# Patient Record
Sex: MALE | Race: WHITE | Employment: UNEMPLOYED
[De-identification: names, ages, dates, MRNs, and addresses within clinical notes are randomized per-mention and may not be internally consistent; named-entity substitution may affect disease eponyms.]

---

## 2024-05-24 ENCOUNTER — APPOINTMENT (OUTPATIENT)
Facility: HOSPITAL | Age: 59
End: 2024-05-24
Attending: STUDENT IN AN ORGANIZED HEALTH CARE EDUCATION/TRAINING PROGRAM
Payer: COMMERCIAL

## 2024-05-24 ENCOUNTER — HOSPITAL ENCOUNTER (EMERGENCY)
Facility: HOSPITAL | Age: 59
Discharge: HOME | End: 2024-05-24
Attending: STUDENT IN AN ORGANIZED HEALTH CARE EDUCATION/TRAINING PROGRAM
Payer: COMMERCIAL

## 2024-05-24 ENCOUNTER — APPOINTMENT (OUTPATIENT)
Facility: HOSPITAL | Age: 59
End: 2024-05-24
Payer: COMMERCIAL

## 2024-05-24 ENCOUNTER — HOSPITAL ENCOUNTER (EMERGENCY)
Facility: HOSPITAL | Age: 59
Discharge: HOME | End: 2024-05-24
Payer: COMMERCIAL

## 2024-05-24 VITALS
DIASTOLIC BLOOD PRESSURE: 88 MMHG | WEIGHT: 213 LBS | SYSTOLIC BLOOD PRESSURE: 161 MMHG | RESPIRATION RATE: 18 BRPM | HEART RATE: 80 BPM | OXYGEN SATURATION: 95 % | TEMPERATURE: 97.9 F | BODY MASS INDEX: 28.23 KG/M2 | HEIGHT: 73 IN

## 2024-05-24 VITALS
BODY MASS INDEX: 29.16 KG/M2 | SYSTOLIC BLOOD PRESSURE: 138 MMHG | RESPIRATION RATE: 22 BRPM | HEART RATE: 72 BPM | DIASTOLIC BLOOD PRESSURE: 75 MMHG | TEMPERATURE: 97.9 F | WEIGHT: 220 LBS | OXYGEN SATURATION: 97 % | HEIGHT: 73 IN

## 2024-05-24 DIAGNOSIS — I10 HYPERTENSION, UNSPECIFIED TYPE: ICD-10-CM

## 2024-05-24 DIAGNOSIS — R07.9 CHEST PAIN, UNSPECIFIED TYPE: Primary | ICD-10-CM

## 2024-05-24 DIAGNOSIS — R07.89 CHEST WALL PAIN: Primary | ICD-10-CM

## 2024-05-24 LAB
ALBUMIN SERPL-MCNC: 3.5 G/DL (ref 3.5–5)
ALBUMIN SERPL-MCNC: 3.6 G/DL (ref 3.5–5)
ALBUMIN/GLOB SERPL: 1 (ref 1.1–2.2)
ALBUMIN/GLOB SERPL: 1.1 (ref 1.1–2.2)
ALP SERPL-CCNC: 61 U/L (ref 45–117)
ALP SERPL-CCNC: 62 U/L (ref 45–117)
ALT SERPL-CCNC: 33 U/L (ref 12–78)
ALT SERPL-CCNC: 35 U/L (ref 12–78)
AMORPH CRY URNS QL MICRO: ABNORMAL
AMPHET UR QL SCN: NEGATIVE
ANION GAP SERPL CALC-SCNC: 6 MMOL/L (ref 5–15)
ANION GAP SERPL CALC-SCNC: 7 MMOL/L (ref 5–15)
APPEARANCE UR: ABNORMAL
AST SERPL-CCNC: 20 U/L (ref 15–37)
AST SERPL-CCNC: 23 U/L (ref 15–37)
BACTERIA URNS QL MICRO: NEGATIVE /HPF
BARBITURATES UR QL SCN: NEGATIVE
BASOPHILS # BLD: 0 K/UL (ref 0–0.1)
BASOPHILS NFR BLD: 1 % (ref 0–1)
BENZODIAZ UR QL: NEGATIVE
BILIRUB SERPL-MCNC: 0.7 MG/DL (ref 0.2–1)
BILIRUB SERPL-MCNC: 0.7 MG/DL (ref 0.2–1)
BILIRUB UR QL CFM: NEGATIVE
BUN SERPL-MCNC: 15 MG/DL (ref 6–20)
BUN SERPL-MCNC: 17 MG/DL (ref 6–20)
BUN/CREAT SERPL: 14 (ref 12–20)
BUN/CREAT SERPL: 15 (ref 12–20)
CALCIUM SERPL-MCNC: 10.1 MG/DL (ref 8.5–10.1)
CALCIUM SERPL-MCNC: 10.3 MG/DL (ref 8.5–10.1)
CANNABINOIDS UR QL SCN: NEGATIVE
CHLORIDE SERPL-SCNC: 105 MMOL/L (ref 97–108)
CHLORIDE SERPL-SCNC: 107 MMOL/L (ref 97–108)
CO2 SERPL-SCNC: 27 MMOL/L (ref 21–32)
CO2 SERPL-SCNC: 28 MMOL/L (ref 21–32)
COCAINE UR QL SCN: POSITIVE
COLOR UR: ABNORMAL
COMMENT:: NORMAL
CREAT SERPL-MCNC: 1.05 MG/DL (ref 0.7–1.3)
CREAT SERPL-MCNC: 1.13 MG/DL (ref 0.7–1.3)
D DIMER PPP FEU-MCNC: 0.58 MG/L FEU (ref 0–0.65)
DIFFERENTIAL METHOD BLD: NORMAL
EOSINOPHIL # BLD: 0.1 K/UL (ref 0–0.4)
EOSINOPHIL NFR BLD: 2 % (ref 0–7)
EPITH CASTS URNS QL MICRO: ABNORMAL /LPF
ERYTHROCYTE [DISTWIDTH] IN BLOOD BY AUTOMATED COUNT: 11.8 % (ref 11.5–14.5)
ERYTHROCYTE [DISTWIDTH] IN BLOOD BY AUTOMATED COUNT: 11.9 % (ref 11.5–14.5)
ETHANOL SERPL-MCNC: <10 MG/DL (ref 0–0.08)
GLOBULIN SER CALC-MCNC: 3.4 G/DL (ref 2–4)
GLOBULIN SER CALC-MCNC: 3.5 G/DL (ref 2–4)
GLUCOSE BLD STRIP.AUTO-MCNC: 105 MG/DL (ref 65–117)
GLUCOSE SERPL-MCNC: 103 MG/DL (ref 65–100)
GLUCOSE SERPL-MCNC: 93 MG/DL (ref 65–100)
GLUCOSE UR STRIP.AUTO-MCNC: NEGATIVE MG/DL
HCT VFR BLD AUTO: 43.2 % (ref 36.6–50.3)
HCT VFR BLD AUTO: 43.2 % (ref 36.6–50.3)
HGB BLD-MCNC: 14.8 G/DL (ref 12.1–17)
HGB BLD-MCNC: 15.3 G/DL (ref 12.1–17)
HGB UR QL STRIP: NEGATIVE
IMM GRANULOCYTES # BLD AUTO: 0 K/UL (ref 0–0.04)
IMM GRANULOCYTES NFR BLD AUTO: 0 % (ref 0–0.5)
KETONES UR QL STRIP.AUTO: ABNORMAL MG/DL
LEUKOCYTE ESTERASE UR QL STRIP.AUTO: NEGATIVE
LIPASE SERPL-CCNC: 87 U/L (ref 13–75)
LYMPHOCYTES # BLD: 1.4 K/UL (ref 0.8–3.5)
LYMPHOCYTES NFR BLD: 26 % (ref 12–49)
Lab: ABNORMAL
MCH RBC QN AUTO: 32.9 PG (ref 26–34)
MCH RBC QN AUTO: 33.7 PG (ref 26–34)
MCHC RBC AUTO-ENTMCNC: 34.3 G/DL (ref 30–36.5)
MCHC RBC AUTO-ENTMCNC: 35.4 G/DL (ref 30–36.5)
MCV RBC AUTO: 95.2 FL (ref 80–99)
MCV RBC AUTO: 96 FL (ref 80–99)
METHADONE UR QL: NEGATIVE
MONOCYTES # BLD: 0.5 K/UL (ref 0–1)
MONOCYTES NFR BLD: 9 % (ref 5–13)
NEUTS SEG # BLD: 3.4 K/UL (ref 1.8–8)
NEUTS SEG NFR BLD: 62 % (ref 32–75)
NITRITE UR QL STRIP.AUTO: NEGATIVE
NRBC # BLD: 0 K/UL (ref 0–0.01)
NRBC # BLD: 0 K/UL (ref 0–0.01)
NRBC BLD-RTO: 0 PER 100 WBC
NRBC BLD-RTO: 0 PER 100 WBC
NT PRO BNP: 179 PG/ML (ref 0–125)
OPIATES UR QL: POSITIVE
PCP UR QL: NEGATIVE
PH UR STRIP: 5.5 (ref 5–8)
PLATELET # BLD AUTO: 187 K/UL (ref 150–400)
PLATELET # BLD AUTO: 190 K/UL (ref 150–400)
PMV BLD AUTO: 10 FL (ref 8.9–12.9)
PMV BLD AUTO: 9.9 FL (ref 8.9–12.9)
POTASSIUM SERPL-SCNC: 3.3 MMOL/L (ref 3.5–5.1)
POTASSIUM SERPL-SCNC: 3.5 MMOL/L (ref 3.5–5.1)
PROT SERPL-MCNC: 7 G/DL (ref 6.4–8.2)
PROT SERPL-MCNC: 7 G/DL (ref 6.4–8.2)
PROT UR STRIP-MCNC: 30 MG/DL
RBC # BLD AUTO: 4.5 M/UL (ref 4.1–5.7)
RBC # BLD AUTO: 4.54 M/UL (ref 4.1–5.7)
RBC #/AREA URNS HPF: ABNORMAL /HPF (ref 0–5)
SERVICE CMNT-IMP: NORMAL
SODIUM SERPL-SCNC: 139 MMOL/L (ref 136–145)
SODIUM SERPL-SCNC: 141 MMOL/L (ref 136–145)
SP GR UR REFRACTOMETRY: 1.03 (ref 1–1.03)
SPECIMEN HOLD: NORMAL
TROPONIN I SERPL HS-MCNC: 36 NG/L (ref 0–76)
TROPONIN I SERPL HS-MCNC: 41 NG/L (ref 0–76)
TROPONIN I SERPL HS-MCNC: 52 NG/L (ref 0–76)
TROPONIN I SERPL HS-MCNC: 53 NG/L (ref 0–76)
UROBILINOGEN UR QL STRIP.AUTO: 1 EU/DL (ref 0.2–1)
WBC # BLD AUTO: 4.9 K/UL (ref 4.1–11.1)
WBC # BLD AUTO: 5.5 K/UL (ref 4.1–11.1)
WBC URNS QL MICRO: ABNORMAL /HPF (ref 0–4)

## 2024-05-24 PROCEDURE — 81001 URINALYSIS AUTO W/SCOPE: CPT

## 2024-05-24 PROCEDURE — 82077 ASSAY SPEC XCP UR&BREATH IA: CPT

## 2024-05-24 PROCEDURE — 6370000000 HC RX 637 (ALT 250 FOR IP): Performed by: STUDENT IN AN ORGANIZED HEALTH CARE EDUCATION/TRAINING PROGRAM

## 2024-05-24 PROCEDURE — 96374 THER/PROPH/DIAG INJ IV PUSH: CPT

## 2024-05-24 PROCEDURE — 2580000003 HC RX 258

## 2024-05-24 PROCEDURE — 80307 DRUG TEST PRSMV CHEM ANLYZR: CPT

## 2024-05-24 PROCEDURE — 99285 EMERGENCY DEPT VISIT HI MDM: CPT

## 2024-05-24 PROCEDURE — 83880 ASSAY OF NATRIURETIC PEPTIDE: CPT

## 2024-05-24 PROCEDURE — 71046 X-RAY EXAM CHEST 2 VIEWS: CPT

## 2024-05-24 PROCEDURE — 82962 GLUCOSE BLOOD TEST: CPT

## 2024-05-24 PROCEDURE — 83690 ASSAY OF LIPASE: CPT

## 2024-05-24 PROCEDURE — 96375 TX/PRO/DX INJ NEW DRUG ADDON: CPT

## 2024-05-24 PROCEDURE — 36415 COLL VENOUS BLD VENIPUNCTURE: CPT

## 2024-05-24 PROCEDURE — 84484 ASSAY OF TROPONIN QUANT: CPT

## 2024-05-24 PROCEDURE — 85027 COMPLETE CBC AUTOMATED: CPT

## 2024-05-24 PROCEDURE — 6360000002 HC RX W HCPCS: Performed by: STUDENT IN AN ORGANIZED HEALTH CARE EDUCATION/TRAINING PROGRAM

## 2024-05-24 PROCEDURE — 6360000002 HC RX W HCPCS

## 2024-05-24 PROCEDURE — 80053 COMPREHEN METABOLIC PANEL: CPT

## 2024-05-24 PROCEDURE — 85025 COMPLETE CBC W/AUTO DIFF WBC: CPT

## 2024-05-24 PROCEDURE — 85379 FIBRIN DEGRADATION QUANT: CPT

## 2024-05-24 PROCEDURE — 99284 EMERGENCY DEPT VISIT MOD MDM: CPT

## 2024-05-24 RX ORDER — ONDANSETRON 2 MG/ML
4 INJECTION INTRAMUSCULAR; INTRAVENOUS ONCE
Status: COMPLETED | OUTPATIENT
Start: 2024-05-24 | End: 2024-05-24

## 2024-05-24 RX ORDER — MORPHINE SULFATE 4 MG/ML
4 INJECTION, SOLUTION INTRAMUSCULAR; INTRAVENOUS
Status: COMPLETED | OUTPATIENT
Start: 2024-05-24 | End: 2024-05-24

## 2024-05-24 RX ORDER — KETOROLAC TROMETHAMINE 30 MG/ML
15 INJECTION, SOLUTION INTRAMUSCULAR; INTRAVENOUS ONCE
Status: COMPLETED | OUTPATIENT
Start: 2024-05-24 | End: 2024-05-24

## 2024-05-24 RX ORDER — FAMOTIDINE 20 MG/1
20 TABLET, FILM COATED ORAL DAILY
Qty: 20 TABLET | Refills: 0 | Status: SHIPPED | OUTPATIENT
Start: 2024-05-24

## 2024-05-24 RX ORDER — NAPROXEN 500 MG/1
500 TABLET ORAL 2 TIMES DAILY
Qty: 20 TABLET | Refills: 0 | Status: SHIPPED | OUTPATIENT
Start: 2024-05-24

## 2024-05-24 RX ORDER — NITROGLYCERIN 0.4 MG/1
0.4 TABLET SUBLINGUAL ONCE
Status: COMPLETED | OUTPATIENT
Start: 2024-05-24 | End: 2024-05-24

## 2024-05-24 RX ORDER — DEXAMETHASONE SODIUM PHOSPHATE 10 MG/ML
10 INJECTION, SOLUTION INTRAMUSCULAR; INTRAVENOUS ONCE
Status: COMPLETED | OUTPATIENT
Start: 2024-05-24 | End: 2024-05-24

## 2024-05-24 RX ORDER — MORPHINE SULFATE 2 MG/ML
2 INJECTION, SOLUTION INTRAMUSCULAR; INTRAVENOUS
Status: COMPLETED | OUTPATIENT
Start: 2024-05-24 | End: 2024-05-24

## 2024-05-24 RX ORDER — LIDOCAINE 50 MG/G
1 PATCH TOPICAL DAILY
Qty: 10 PATCH | Refills: 0 | Status: SHIPPED | OUTPATIENT
Start: 2024-05-24 | End: 2024-06-03

## 2024-05-24 RX ORDER — 0.9 % SODIUM CHLORIDE 0.9 %
1000 INTRAVENOUS SOLUTION INTRAVENOUS ONCE
Status: COMPLETED | OUTPATIENT
Start: 2024-05-24 | End: 2024-05-24

## 2024-05-24 RX ADMIN — ONDANSETRON 4 MG: 2 INJECTION INTRAMUSCULAR; INTRAVENOUS at 18:48

## 2024-05-24 RX ADMIN — SODIUM CHLORIDE 1000 ML: 9 INJECTION, SOLUTION INTRAVENOUS at 22:06

## 2024-05-24 RX ADMIN — KETOROLAC TROMETHAMINE 15 MG: 30 INJECTION, SOLUTION INTRAMUSCULAR; INTRAVENOUS at 22:06

## 2024-05-24 RX ADMIN — NITROGLYCERIN 0.4 MG: 0.4 TABLET, ORALLY DISINTEGRATING SUBLINGUAL at 01:49

## 2024-05-24 RX ADMIN — MORPHINE SULFATE 4 MG: 4 INJECTION INTRAVENOUS at 03:31

## 2024-05-24 RX ADMIN — MORPHINE SULFATE 2 MG: 2 INJECTION, SOLUTION INTRAMUSCULAR; INTRAVENOUS at 18:48

## 2024-05-24 RX ADMIN — DEXAMETHASONE SODIUM PHOSPHATE 10 MG: 10 INJECTION, SOLUTION INTRAMUSCULAR; INTRAVENOUS at 22:06

## 2024-05-24 RX ADMIN — KETOROLAC TROMETHAMINE 15 MG: 30 INJECTION, SOLUTION INTRAMUSCULAR; INTRAVENOUS at 01:49

## 2024-05-24 ASSESSMENT — PAIN - FUNCTIONAL ASSESSMENT
PAIN_FUNCTIONAL_ASSESSMENT: 0-10
PAIN_FUNCTIONAL_ASSESSMENT: 0-10

## 2024-05-24 ASSESSMENT — HEART SCORE: ECG: NON-SPECIFC REPOLARIZATION DISTURBANCE/LBTB/PM

## 2024-05-24 ASSESSMENT — PAIN DESCRIPTION - ORIENTATION: ORIENTATION: LEFT

## 2024-05-24 ASSESSMENT — PAIN SCALES - GENERAL
PAINLEVEL_OUTOF10: 8
PAINLEVEL_OUTOF10: 7

## 2024-05-24 ASSESSMENT — PAIN DESCRIPTION - DESCRIPTORS: DESCRIPTORS: ACHING

## 2024-05-24 ASSESSMENT — PAIN DESCRIPTION - LOCATION: LOCATION: CHEST

## 2024-05-24 NOTE — ED NOTES
Verbal shift change report given to Sheila RN (oncoming nurse) by PRABHA RN (offgoing nurse). Report included the following information Nurse Handoff Report, ED Encounter Summary, ED SBAR, Intake/Output, MAR, and Recent Results.

## 2024-05-24 NOTE — ED NOTES
Patient has been instructed that they have been given morphine which contains opioids, benzodiazepines, or other sedating drugs. Patient is aware that they  will need to refrain from driving or operating heavy machinery after taking this medication.  Patient also instructed that they need to avoid drinking alcohol and using other products containing opioids, benzodiazepines, or other sedating drugs.  Patient verbalized understanding.

## 2024-05-24 NOTE — ED NOTES
Discharge instructions were given to the patient by BERE Powell.     The patient left the Emergency Department alert and oriented and in no acute distress with 3 prescriptions. The patient was encouraged to call or return to the ED for worsening issues or problems and was encouraged to schedule a follow up appointment for continuing care.     Ambulation assessment completed before discharge.  Pt left Emergency Department ambulating at baseline with no ortho devices  Ortho device education: none    The patient verbalized understanding of discharge instructions and prescriptions, all questions were answered. The patient has no further concerns at this time.
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
## 2024-05-24 NOTE — ED PROVIDER NOTES
lidocaine 5 %  Commonly known as: LIDODERM  Place 1 patch onto the skin daily for 10 days 12 hours on, 12 hours off.     naproxen 500 MG tablet  Commonly known as: Naprosyn  Take 1 tablet by mouth 2 times daily               Where to Get Your Medications        These medications were sent to [facility] [id number] - [place], [place] - [address] [facility] - P [phone] - F [phone]  [address] [facility] [address]      Phone: [phone]   famotidine 20 MG tablet  lidocaine 5 %  naproxen 500 MG tablet       2. Josh Mendiola III, MD  [address] [place] [address] [phone]    Schedule an appointment as soon as possible for a visit today      [facility] - [facility] OFFICE  [address] [place] [id number] [phone]  Call today      [facility] EMERGENCY DEPT  [address] [place] [address] [phone]    As needed, If symptoms worsen    Return to ED if worse     Diagnosis     Clinical Impression: Acute chest pain               Elizabeth Nath MD  05/24/24 4578

## 2024-05-24 NOTE — ED PROVIDER NOTES
[facility] EMERGENCY DEPT  EMERGENCY DEPARTMENT ENCOUNTER       Pt Name: Justino Preciado  MRN: [id number]  Birthdate 1965  Date of evaluation: 5/24/2024  Provider: Jennifer Mulligan PA-C   PCP: No primary care provider on file.  Note Started: 7:22 PM EDT 5/24/24     CHIEF COMPLAINT       Chief Complaint   Patient presents with    Chest Pain        HISTORY OF PRESENT ILLNESS: 1 or more elements      History From: Patient  HPI Limitations: None     Justino Preciado is a 59 y.o. male who presents to the emergency department for evaluation of left-sided chest and arm pain.  Patient additionally reporting weakness in the left upper extremity, however states that this is chronic.  Patient seen in the emergency department earlier this morning for the same complaint.  Patient has not picked up the medications prescribed to her earlier today, and additionally reports being noncompliant with his medications at home.  Patient reports nausea at this time, denies vomiting or diarrhea.     Nursing Notes were all reviewed and agreed with or any disagreements were addressed in the HPI.     REVIEW OF SYSTEMS      Review of Systems     Positives and Pertinent negatives as per HPI.    PAST HISTORY     Past Medical History:  Past Medical History:   Diagnosis Date    Hypertension        Past Surgical History:  History reviewed. No pertinent surgical history.    Family History:  History reviewed. No pertinent family history.    Social History:  Social History     Tobacco Use    Smoking status: Every Day     Types: Cigarettes    Smokeless tobacco: Never   Substance Use Topics    Alcohol use: Yes    Drug use: Not Currently       Allergies:  No Known Allergies    CURRENT MEDICATIONS      Previous Medications    FAMOTIDINE (PEPCID) 20 MG TABLET    Take 1 tablet by mouth daily    LIDOCAINE (LIDODERM) 5 %    Place 1 patch onto the skin daily for 10 days 12 hours on, 12 hours off.    NAPROXEN (NAPROSYN) 500 MG TABLET    Take 1 tablet by mouth 2 times

## 2024-05-24 NOTE — ED NOTES
Pt presents to ED ambulatory complaining of sharp middle chest pain radiating down to the left arm and generalized weakness. Pt also reports numbness and decreased sensation throughout left side. Pt states he was seen for same complaints earlier this morning, and not able to  medicine until after memorial day. Pt also reports headache, dizziness, and nausea. Pt is alert and oriented x 4, RR even and unlabored, skin is warm and dry. Assessment completed and pt updated on plan of care.  Call bell in reach.      Emergency Department Nursing Plan of Care       The Nursing Plan of Care is developed from the Nursing assessment and Emergency Department Attending provider initial evaluation.  The plan of care may be reviewed in the “ED Provider note”.    The Plan of Care was developed with the following considerations:   Patient / Family readiness to learn indicated by:verbalized understanding  Persons(s) to be included in education: patient  Barriers to Learning/Limitations:None    Signed

## 2024-05-24 NOTE — ED TRIAGE NOTES
Pt reports left sided chest pain and arm pain, generalized weakness. Pt seen for same earlier this morning, not able to  medicine until after memorial day. Also reports being nonadherent with home meds, concerned for a blockage to the left chest

## 2024-05-24 NOTE — ED TRIAGE NOTES
Pt reports to ED w/ constant central chest pain that radiates to left shoulder x 1 day.  Pt states has HX of HTN not compliant with meds.  Pt hypertensive during discharge.  Pt also states on statins but not compliant with meds.  Pt states pain feels like a stabbing sensation.

## 2024-05-28 LAB
EKG ATRIAL RATE: 77 BPM
EKG ATRIAL RATE: 81 BPM
EKG DIAGNOSIS: NORMAL
EKG DIAGNOSIS: NORMAL
EKG P AXIS: 16 DEGREES
EKG P AXIS: 48 DEGREES
EKG P-R INTERVAL: 156 MS
EKG P-R INTERVAL: 166 MS
EKG Q-T INTERVAL: 372 MS
EKG Q-T INTERVAL: 394 MS
EKG QRS DURATION: 100 MS
EKG QRS DURATION: 90 MS
EKG QTC CALCULATION (BAZETT): 432 MS
EKG QTC CALCULATION (BAZETT): 445 MS
EKG R AXIS: -2 DEGREES
EKG R AXIS: -30 DEGREES
EKG T AXIS: 105 DEGREES
EKG T AXIS: 105 DEGREES
EKG VENTRICULAR RATE: 77 BPM
EKG VENTRICULAR RATE: 81 BPM

## 2024-10-08 ENCOUNTER — HOSPITAL ENCOUNTER (EMERGENCY)
Facility: HOSPITAL | Age: 59
Discharge: HOME | End: 2024-10-08
Attending: EMERGENCY MEDICINE

## 2024-10-08 VITALS
HEIGHT: 73 IN | WEIGHT: 214.5 LBS | SYSTOLIC BLOOD PRESSURE: 189 MMHG | DIASTOLIC BLOOD PRESSURE: 105 MMHG | RESPIRATION RATE: 18 BRPM | BODY MASS INDEX: 28.43 KG/M2 | HEART RATE: 58 BPM | OXYGEN SATURATION: 96 % | TEMPERATURE: 97.7 F

## 2024-10-08 DIAGNOSIS — I16.0 HYPERTENSIVE URGENCY: Primary | ICD-10-CM

## 2024-10-08 PROCEDURE — 99283 EMERGENCY DEPT VISIT LOW MDM: CPT

## 2024-10-08 PROCEDURE — 93005 ELECTROCARDIOGRAM TRACING: CPT | Performed by: EMERGENCY MEDICINE

## 2024-10-08 PROCEDURE — 6370000000 HC RX 637 (ALT 250 FOR IP): Performed by: EMERGENCY MEDICINE

## 2024-10-08 RX ORDER — AMLODIPINE BESYLATE 10 MG/1
10 TABLET ORAL DAILY
Qty: 30 TABLET | Refills: 1 | Status: SHIPPED | OUTPATIENT
Start: 2024-10-08

## 2024-10-08 RX ORDER — LOSARTAN POTASSIUM 25 MG/1
25 TABLET ORAL DAILY
Qty: 30 TABLET | Refills: 1 | Status: SHIPPED | OUTPATIENT
Start: 2024-10-08

## 2024-10-08 RX ORDER — LOSARTAN POTASSIUM 25 MG/1
25 TABLET ORAL DAILY
Qty: 30 TABLET | Refills: 1 | Status: SHIPPED | OUTPATIENT
Start: 2024-10-08 | End: 2024-10-08

## 2024-10-08 RX ORDER — AMLODIPINE BESYLATE 10 MG/1
10 TABLET ORAL DAILY
Qty: 30 TABLET | Refills: 1 | Status: SHIPPED | OUTPATIENT
Start: 2024-10-08 | End: 2024-10-08

## 2024-10-08 RX ORDER — AMLODIPINE BESYLATE 5 MG/1
10 TABLET ORAL
Status: COMPLETED | OUTPATIENT
Start: 2024-10-08 | End: 2024-10-08

## 2024-10-08 RX ADMIN — AMLODIPINE BESYLATE 10 MG: 5 TABLET ORAL at 14:56

## 2024-10-08 ASSESSMENT — PAIN SCALES - GENERAL: PAINLEVEL_OUTOF10: 0

## 2024-10-08 ASSESSMENT — PAIN - FUNCTIONAL ASSESSMENT: PAIN_FUNCTIONAL_ASSESSMENT: 0-10

## 2024-10-08 NOTE — ED TRIAGE NOTES
Pt reports feeling lightheaded/dizzy x 1-2 months intermittently. Pt concerned that his BP is high, has not taken meds in 6 months

## 2024-10-08 NOTE — ED NOTES
Pt presents ambulatory to ED complaining of intermittent dizziness without vision changes, N/V x2 months. He reports being prescribed BP medication for HTN but has not taken them within x6 months due to running out and not getting a refill from PCP. Pt denies chest pain or SOB. He denies weakness or deficients in his extremities, speech is clear. Pt is alert and oriented x 4, RR even and unlabored, skin is warm and dry. Assessment completed and pt updated on plan of care.        Emergency Department Nursing Plan of Care        The Nursing Plan of Care is developed from the Nursing assessment and Emergency Department Attending provider initial evaluation.  The plan of care may be reviewed in the “ED Provider note”.

## 2024-10-08 NOTE — ED NOTES
Discharge instructions were given to the patient by Kayley Crum RN  .     The patient left the Emergency Department ambulatory, alert and oriented and in no acute distress with 2 prescriptions. The patient was encouraged to call or return to the ED for worsening issues or problems and was encouraged to schedule a follow up appointment for continuing care. Patient discharged home ambulatory with a steady gait.    The patient verbalized understanding of discharge instructions and prescriptions, all questions were answered. The patient has no further concerns at this time.

## 2024-10-08 NOTE — DISCHARGE INSTRUCTIONS
It was a pleasure taking care of you at [facility] Emergency Department today.  We know that when you come to [facility], you are entrusting us with your health, comfort, and safety.  Our physicians and nurses honor that trust, and we appreciate the opportunity to care for you and your loved ones.  We also value your feedback, and we would like to hear from you.    If you receive a  >>> survey <<< about your Emergency Department experience today, please fill it out. We review every single response from our patients. Thank you!

## 2024-10-08 NOTE — ED PROVIDER NOTES
reviewed and are negative.      Physical Exam   Vital Signs  Patient Vitals for the past 8 hrs:   Temp Pulse Resp BP SpO2   10/08/24 1445 -- -- -- (!) 198/102 96 %   10/08/24 1341 97.7 °F (36.5 °C) 58 18 (!) 193/107 100 %          Physical Exam  Vitals reviewed.   Constitutional:       General: He is not in acute distress.     Appearance: Normal appearance. He is not ill-appearing.   Cardiovascular:      Rate and Rhythm: Normal rate and regular rhythm.   Pulmonary:      Effort: Pulmonary effort is normal. No respiratory distress.      Breath sounds: No wheezing or rhonchi.   Abdominal:      General: Abdomen is flat. There is no distension.      Palpations: Abdomen is soft.      Tenderness: There is no abdominal tenderness.   Skin:     General: Skin is warm and dry.   Neurological:      General: No focal deficit present.      Mental Status: He is alert and oriented to person, place, and time.      Cranial Nerves: Cranial nerves 2-12 are intact. No cranial nerve deficit, dysarthria or facial asymmetry.      Sensory: Sensation is intact.      Motor: Motor function is intact. No abnormal muscle tone.      Coordination: Coordination is intact. Finger-Nose-Finger Test normal.         Diagnostic Study Results   Labs  Results for orders placed or performed during the hospital encounter of 10/08/24   EKG 12 Lead   Result Value Ref Range    Ventricular Rate 60 BPM    Atrial Rate 60 BPM    P-R Interval 164 ms    QRS Duration 90 ms    Q-T Interval 414 ms    QTc Calculation (Bazett) 414 ms    P Axis 44 degrees    R Axis 38 degrees    T Axis 52 degrees    Diagnosis       Normal sinus rhythm  Anteroseptal infarct (cited on or before 24-MAY-2024)  Abnormal ECG  When compared with ECG of 24-MAY-2024 18:32,  No significant change was found        ==============================================================    Radiologic Studies  No orders to display          Critical Care and Billable Procedures   EKG reviewed by ED Physician

## 2024-10-09 LAB
EKG ATRIAL RATE: 60 BPM
EKG DIAGNOSIS: NORMAL
EKG P AXIS: 44 DEGREES
EKG P-R INTERVAL: 164 MS
EKG Q-T INTERVAL: 414 MS
EKG QRS DURATION: 90 MS
EKG QTC CALCULATION (BAZETT): 414 MS
EKG R AXIS: 38 DEGREES
EKG T AXIS: 52 DEGREES
EKG VENTRICULAR RATE: 60 BPM

## 2025-04-13 ENCOUNTER — HOSPITAL ENCOUNTER (INPATIENT)
Facility: HOSPITAL | Age: 60
LOS: 11 days | Discharge: OTHER INSTITUTION | DRG: 751 | End: 2025-04-25
Attending: STUDENT IN AN ORGANIZED HEALTH CARE EDUCATION/TRAINING PROGRAM | Admitting: PSYCHIATRY & NEUROLOGY
Payer: MEDICAID

## 2025-04-13 ENCOUNTER — APPOINTMENT (OUTPATIENT)
Facility: HOSPITAL | Age: 60
DRG: 751 | End: 2025-04-13
Payer: MEDICAID

## 2025-04-13 DIAGNOSIS — R45.851 SUICIDAL IDEATION: Primary | ICD-10-CM

## 2025-04-13 LAB
ALBUMIN SERPL-MCNC: 3.5 G/DL (ref 3.5–5)
ALBUMIN/GLOB SERPL: 0.9 (ref 1.1–2.2)
ALP SERPL-CCNC: 103 U/L (ref 45–117)
ALT SERPL-CCNC: 19 U/L (ref 12–78)
AMPHET UR QL SCN: NEGATIVE
ANION GAP SERPL CALC-SCNC: 0 MMOL/L (ref 2–12)
APAP SERPL-MCNC: <2 UG/ML (ref 10–30)
APPEARANCE UR: CLEAR
AST SERPL-CCNC: 16 U/L (ref 15–37)
BACTERIA URNS QL MICRO: NEGATIVE /HPF
BARBITURATES UR QL SCN: NEGATIVE
BASOPHILS # BLD: 0.03 K/UL (ref 0–0.1)
BASOPHILS NFR BLD: 0.6 % (ref 0–1)
BENZODIAZ UR QL: NEGATIVE
BILIRUB SERPL-MCNC: 0.4 MG/DL (ref 0.2–1)
BILIRUB UR QL: NEGATIVE
BUN SERPL-MCNC: 11 MG/DL (ref 6–20)
BUN/CREAT SERPL: 13 (ref 12–20)
CALCIUM SERPL-MCNC: 10.2 MG/DL (ref 8.5–10.1)
CANNABINOIDS UR QL SCN: NEGATIVE
CHLORIDE SERPL-SCNC: 109 MMOL/L (ref 97–108)
CO2 SERPL-SCNC: 29 MMOL/L (ref 21–32)
COCAINE UR QL SCN: POSITIVE
COLOR UR: NORMAL
CREAT SERPL-MCNC: 0.85 MG/DL (ref 0.7–1.3)
DIFFERENTIAL METHOD BLD: NORMAL
EOSINOPHIL # BLD: 0.14 K/UL (ref 0–0.4)
EOSINOPHIL NFR BLD: 2.6 % (ref 0–7)
EPITH CASTS URNS QL MICRO: NORMAL /LPF
ERYTHROCYTE [DISTWIDTH] IN BLOOD BY AUTOMATED COUNT: 12.6 % (ref 11.5–14.5)
ETHANOL SERPL-MCNC: <10 MG/DL (ref 0–0.08)
FLUAV RNA SPEC QL NAA+PROBE: NOT DETECTED
FLUBV RNA SPEC QL NAA+PROBE: NOT DETECTED
GLOBULIN SER CALC-MCNC: 3.8 G/DL (ref 2–4)
GLUCOSE SERPL-MCNC: 100 MG/DL (ref 65–100)
GLUCOSE UR STRIP.AUTO-MCNC: NEGATIVE MG/DL
HCT VFR BLD AUTO: 42.4 % (ref 36.6–50.3)
HGB BLD-MCNC: 14.3 G/DL (ref 12.1–17)
HGB UR QL STRIP: NEGATIVE
HYALINE CASTS URNS QL MICRO: NORMAL /LPF (ref 0–5)
IMM GRANULOCYTES # BLD AUTO: 0.02 K/UL (ref 0–0.04)
IMM GRANULOCYTES NFR BLD AUTO: 0.4 % (ref 0–0.5)
KETONES UR QL STRIP.AUTO: NEGATIVE MG/DL
LEUKOCYTE ESTERASE UR QL STRIP.AUTO: NEGATIVE
LYMPHOCYTES # BLD: 1.56 K/UL (ref 0.8–3.5)
LYMPHOCYTES NFR BLD: 28.8 % (ref 12–49)
Lab: ABNORMAL
MCH RBC QN AUTO: 30.9 PG (ref 26–34)
MCHC RBC AUTO-ENTMCNC: 33.7 G/DL (ref 30–36.5)
MCV RBC AUTO: 91.6 FL (ref 80–99)
METHADONE UR QL: NEGATIVE
MONOCYTES # BLD: 0.31 K/UL (ref 0–1)
MONOCYTES NFR BLD: 5.7 % (ref 5–13)
NEUTS SEG # BLD: 3.36 K/UL (ref 1.8–8)
NEUTS SEG NFR BLD: 61.9 % (ref 32–75)
NITRITE UR QL STRIP.AUTO: NEGATIVE
NRBC # BLD: 0 K/UL (ref 0–0.01)
NRBC BLD-RTO: 0 PER 100 WBC
OPIATES UR QL: NEGATIVE
PCP UR QL: NEGATIVE
PH UR STRIP: 7 (ref 5–8)
PLATELET # BLD AUTO: 154 K/UL (ref 150–400)
PMV BLD AUTO: 10 FL (ref 8.9–12.9)
POTASSIUM SERPL-SCNC: 3.9 MMOL/L (ref 3.5–5.1)
PROT SERPL-MCNC: 7.3 G/DL (ref 6.4–8.2)
PROT UR STRIP-MCNC: NEGATIVE MG/DL
RBC # BLD AUTO: 4.63 M/UL (ref 4.1–5.7)
RBC #/AREA URNS HPF: NORMAL /HPF (ref 0–5)
SALICYLATES SERPL-MCNC: <1.7 MG/DL (ref 2.8–20)
SARS-COV-2 RNA RESP QL NAA+PROBE: NOT DETECTED
SODIUM SERPL-SCNC: 138 MMOL/L (ref 136–145)
SOURCE: NORMAL
SP GR UR REFRACTOMETRY: 1.02 (ref 1–1.03)
SPECIMEN HOLD: NORMAL
TROPONIN I SERPL HS-MCNC: 24 NG/L (ref 0–76)
URINE CULTURE IF INDICATED: NORMAL
UROBILINOGEN UR QL STRIP.AUTO: 0.2 EU/DL (ref 0.2–1)
WBC # BLD AUTO: 5.4 K/UL (ref 4.1–11.1)
WBC URNS QL MICRO: NORMAL /HPF (ref 0–4)

## 2025-04-13 PROCEDURE — 80307 DRUG TEST PRSMV CHEM ANLYZR: CPT

## 2025-04-13 PROCEDURE — 90791 PSYCH DIAGNOSTIC EVALUATION: CPT

## 2025-04-13 PROCEDURE — 80143 DRUG ASSAY ACETAMINOPHEN: CPT

## 2025-04-13 PROCEDURE — 71046 X-RAY EXAM CHEST 2 VIEWS: CPT

## 2025-04-13 PROCEDURE — 82077 ASSAY SPEC XCP UR&BREATH IA: CPT

## 2025-04-13 PROCEDURE — 80053 COMPREHEN METABOLIC PANEL: CPT

## 2025-04-13 PROCEDURE — 80179 DRUG ASSAY SALICYLATE: CPT

## 2025-04-13 PROCEDURE — 81001 URINALYSIS AUTO W/SCOPE: CPT

## 2025-04-13 PROCEDURE — 99285 EMERGENCY DEPT VISIT HI MDM: CPT

## 2025-04-13 PROCEDURE — 84484 ASSAY OF TROPONIN QUANT: CPT

## 2025-04-13 PROCEDURE — 6370000000 HC RX 637 (ALT 250 FOR IP): Performed by: NURSE PRACTITIONER

## 2025-04-13 PROCEDURE — 87636 SARSCOV2 & INF A&B AMP PRB: CPT

## 2025-04-13 PROCEDURE — 93005 ELECTROCARDIOGRAM TRACING: CPT | Performed by: NURSE PRACTITIONER

## 2025-04-13 PROCEDURE — 85025 COMPLETE CBC W/AUTO DIFF WBC: CPT

## 2025-04-13 RX ORDER — AMLODIPINE BESYLATE 5 MG/1
10 TABLET ORAL
Status: COMPLETED | OUTPATIENT
Start: 2025-04-13 | End: 2025-04-13

## 2025-04-13 RX ADMIN — AMLODIPINE BESYLATE 10 MG: 5 TABLET ORAL at 20:08

## 2025-04-13 ASSESSMENT — PAIN - FUNCTIONAL ASSESSMENT: PAIN_FUNCTIONAL_ASSESSMENT: NONE - DENIES PAIN
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
## 2025-04-13 NOTE — ED NOTES
6:11 PM    Pt reports \" I just feel like dying again.\" States that he has suicidal thoughts, \"walking in front of a bus.\" Was already seen at [facility] ER today for back pain, states that he told him he was suicidal and \"they can't help me.\" ( There is no notation of this in chart review).  No HI, delusions, hallucinations. Has chronic lower back pain.      Homeless currently. Has sister in [place]. (+ ) ETOH, denies tobacco/ substance abuse.  [facility] 1 mos ago [facility], was told to follow up with [facility]- and never has. Not taking any medications as directed. States last cocaine use was 1 mos ago.     I have evaluated the patient as the Provider in Rapid Medical Evaluation (RME). I have reviewed his vital signs and the triage nurse assessment. I have talked with the patient and any available family and advised that I am the provider in triage and have ordered the appropriate study to initiate their work up based on the clinical presentation during my assessment. I have advised that the patient will be accommodated in the Main ED as soon as possible. I have also requested to contact the triage nurse or myself immediately if the patient experiences any changes in their condition during this brief waiting period.  PRASHANT Bourgeois NP, Leslie A, APRN - NP  04/14/25 0034
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
## 2025-04-13 NOTE — ED PROVIDER NOTES
[facility] EMERGENCY DEPARTMENT  EMERGENCY DEPARTMENT ENCOUNTER      Pt Name: Justino Preciado  MRN: [id number]  Birthdate 1965  Date of evaluation: 4/13/2025  Provider: Christiano Marino DO    CHIEF COMPLAINT       Chief Complaint   Patient presents with    Suicidal         HISTORY OF PRESENT ILLNESS   (Location/Symptom, Timing/Onset, Context/Setting, Quality, Duration, Modifying Factors, Severity)  Note limiting factors.   Patient is a 60-year-old male history of hypertension presenting today with suicidal ideation.  He states that he has been suicidal for as long as he can remember but it is getting worse.  He blames it on circumstances regarding life but cannot pinpoint anything.  He states that about a month ago he tried to walk into traffic.  He denies doing anything to harm himself today.  [facility] but [facility].  Denies any drug or alcohol use to me.    He reports from a medical perspective he has chronic low back pain, had hematuria the other day which has resolved and has chest pain.  Chest pain seems to be associated with anxiety--stating that when he is suicidal and anxious the pain worsens but when the anxiety improves the chest pain goes away.  It is not pleuritic or exertional    Also reports cough and congestion x months            Review of External Medical Records:     Nursing Notes were reviewed.    REVIEW OF SYSTEMS    (2-9 systems for level 4, 10 or more for level 5)     Review of Systems   Cardiovascular:  Positive for chest pain.   Genitourinary:  Positive for hematuria.   Musculoskeletal:  Positive for back pain.       Except as noted above the remainder of the review of systems was reviewed and negative.       PAST MEDICAL HISTORY     Past Medical History:   Diagnosis Date    Hypertension          SURGICAL HISTORY     No past surgical history on file.      CURRENT MEDICATIONS       Previous Medications    AMLODIPINE (NORVASC) 10 MG TABLET    Take 1 tablet by mouth daily  DRUG SCREEN - Abnormal; Notable for the following components:    Cocaine, Urine Positive (*)     All other components within normal limits   URINE CULTURE HOLD SAMPLE   COVID-19 & INFLUENZA COMBO   CBC WITH AUTO DIFFERENTIAL   ETHANOL   URINALYSIS WITH REFLEX TO CULTURE   TROPONIN       All other labs were within normal range or not returned as of this dictation.          COURSE/REASSESSMENT     ED Course as of 04/13/25 2048   Sun Apr 13, 2025   1842 EKG time 1840  Normal sinus rhythm rate of 87 with left anterior fascicular block, narrow QRS, normal QTc, no ST elevations or [CC]   1930 D/w tele-psych. Recommending admission. Voluntary for now but seek TDO if tries to leave [CC]      ED Course User Index  [CC] Christiano Marino E, DO           CONSULTS:  IP CONSULT TO TELE-PSYCH (SOCIAL WORK ONLY)  IP CONSULT TO BSMART    PROCEDURES:  Unless otherwise noted below, none     Procedures      DIFFERENTIAL DIAGNOSIS/MDM:   Medical Decision Making  60-year-old male presents today with suicidal.  He has a plan with minimal resources.  Seen by psychiatry with plans of admission.  He also complained of hematuria although his urinalysis is clear today.  UDS is positive for cocaine so troponin has been ordered especially since he reports some chest discomfort.  This chest discomfort is a bit atypical for cardiac pain as he tells me it is present only when he is anxious or suicidal and it improves when he is feeling better.  He also has a cough and congestion therefore COVID and flu testing sent.  If troponin is negative and COVID/flu testing are negative he will be medically cleared as the remainder of his workup is reassuring.    He signed out to the nighttime doctor, Dr Macias, pending remainder of medical clearance and bed placement.     Problems Addressed:  Suicidal ideation: acute illness or injury    Amount and/or Complexity of Data Reviewed  Labs: ordered. Decision-making details documented in ED Course.  Radiology:

## 2025-04-14 PROBLEM — F25.9 SCHIZOAFFECTIVE DISORDER (HCC): Status: ACTIVE | Noted: 2025-04-14

## 2025-04-14 LAB
EKG ATRIAL RATE: 87 BPM
EKG DIAGNOSIS: NORMAL
EKG P AXIS: 31 DEGREES
EKG P-R INTERVAL: 162 MS
EKG Q-T INTERVAL: 364 MS
EKG QRS DURATION: 88 MS
EKG QTC CALCULATION (BAZETT): 438 MS
EKG R AXIS: -46 DEGREES
EKG T AXIS: 43 DEGREES
EKG VENTRICULAR RATE: 87 BPM

## 2025-04-14 PROCEDURE — 93010 ELECTROCARDIOGRAM REPORT: CPT | Performed by: INTERNAL MEDICINE

## 2025-04-14 PROCEDURE — 1240000000 HC EMOTIONAL WELLNESS R&B

## 2025-04-14 PROCEDURE — 6370000000 HC RX 637 (ALT 250 FOR IP): Performed by: NURSE PRACTITIONER

## 2025-04-14 RX ORDER — DIPHENHYDRAMINE HYDROCHLORIDE 50 MG/ML
50 INJECTION, SOLUTION INTRAMUSCULAR; INTRAVENOUS EVERY 4 HOURS PRN
Status: DISCONTINUED | OUTPATIENT
Start: 2025-04-14 | End: 2025-04-25 | Stop reason: HOSPADM

## 2025-04-14 RX ORDER — POLYETHYLENE GLYCOL 3350 17 G/17G
17 POWDER, FOR SOLUTION ORAL DAILY PRN
Status: DISCONTINUED | OUTPATIENT
Start: 2025-04-14 | End: 2025-04-25 | Stop reason: HOSPADM

## 2025-04-14 RX ORDER — HYDROXYZINE HYDROCHLORIDE 50 MG/1
50 TABLET, FILM COATED ORAL 3 TIMES DAILY PRN
Status: DISCONTINUED | OUTPATIENT
Start: 2025-04-14 | End: 2025-04-25 | Stop reason: HOSPADM

## 2025-04-14 RX ORDER — HALOPERIDOL 5 MG/ML
5 INJECTION INTRAMUSCULAR EVERY 4 HOURS PRN
Status: DISCONTINUED | OUTPATIENT
Start: 2025-04-14 | End: 2025-04-25 | Stop reason: HOSPADM

## 2025-04-14 RX ORDER — TRAZODONE HYDROCHLORIDE 50 MG/1
50 TABLET ORAL NIGHTLY PRN
Status: DISCONTINUED | OUTPATIENT
Start: 2025-04-14 | End: 2025-04-17

## 2025-04-14 RX ORDER — ACETAMINOPHEN 325 MG/1
650 TABLET ORAL EVERY 4 HOURS PRN
Status: DISCONTINUED | OUTPATIENT
Start: 2025-04-14 | End: 2025-04-25 | Stop reason: HOSPADM

## 2025-04-14 RX ORDER — MAGNESIUM HYDROXIDE/ALUMINUM HYDROXICE/SIMETHICONE 120; 1200; 1200 MG/30ML; MG/30ML; MG/30ML
30 SUSPENSION ORAL EVERY 6 HOURS PRN
Status: DISCONTINUED | OUTPATIENT
Start: 2025-04-14 | End: 2025-04-25 | Stop reason: HOSPADM

## 2025-04-14 RX ORDER — SENNOSIDES A AND B 8.6 MG/1
1 TABLET, FILM COATED ORAL DAILY PRN
Status: DISCONTINUED | OUTPATIENT
Start: 2025-04-14 | End: 2025-04-25 | Stop reason: HOSPADM

## 2025-04-14 RX ORDER — HALOPERIDOL 5 MG/1
5 TABLET ORAL EVERY 4 HOURS PRN
Status: DISCONTINUED | OUTPATIENT
Start: 2025-04-14 | End: 2025-04-25 | Stop reason: HOSPADM

## 2025-04-14 RX ADMIN — HYDROXYZINE HYDROCHLORIDE 50 MG: 50 TABLET, FILM COATED ORAL at 20:52

## 2025-04-14 ASSESSMENT — SLEEP AND FATIGUE QUESTIONNAIRES
DO YOU HAVE DIFFICULTY SLEEPING: YES
AVERAGE NUMBER OF SLEEP HOURS: 6
DO YOU USE A SLEEP AID: NO

## 2025-04-14 ASSESSMENT — LIFESTYLE VARIABLES
HOW OFTEN DO YOU HAVE A DRINK CONTAINING ALCOHOL: PATIENT DECLINED
HOW MANY STANDARD DRINKS CONTAINING ALCOHOL DO YOU HAVE ON A TYPICAL DAY: PATIENT DECLINED

## 2025-04-14 NOTE — BH NOTE
Admission unit:General    Received from: ED    Admission diagnosis:depression    Admission status: Voluntary     Pt is alert and oriented x4. Pt denies SI, HI, A/VH. Pt is hostile and guarded. Pt is very worried about his belongings being taken by staff. Pt did have a bottle of Gin, this RN reached out to director who states if he does not have someone to pick it up then we must disregard, pt did not have someone else to pick it up. Pt continues to be demanding an irritable towards staff.     Pt requested mucinex due to recent covid, still has some congestion and coughing.     Alcohol/drug: cocaine    PTA meds verified: Talked with [place] patient has not picked up medications in over 62 days. - norvasc, cozaar, pepcid, and naproxen.    PT or consults required: none at this time.

## 2025-04-14 NOTE — BSMART NOTE
This writer rounded on patient.  Patient agreeable to talk with this writer and was informed of counselor's role.    The patient's appearance shows no evidence of impairment.  The patient's behavior shows no evidence of impairment. The patient is oriented to time, place, person and situation.  The patient's speech shows no evidence of impairment.  The patient's mood  is depressed and is sad.  The range of affect is flat.  The patient's thought content  demonstrates thoughts of death.  The thought process perseveration.  The patient's perception demonstrated changes in the following:  auditory  hallucinations. The patient's memory shows no evidence of impairment.  The patient's appetite shows no evidence of impairment.  The patient's sleep shows no evidence of impairment. The patient shows little insight.  The patient's judgement is psychologically impaired.  Patient reports suicidal ideation and no homicidal ideation.   The plan is voluntary admission once medically cleared note is in. Patient agreeable to [place] kaylee.

## 2025-04-14 NOTE — CARE COORDINATION
04/14/25 1553   Suicidal Ideation   Wish to be Dead Lifetime - Yes;Past 1 month - Yes   Non-Specific Active Suicidal Thoughts Lifetime - Yes;Past 1 month - Yes   Suicidal Behavior Trigger Wish to be Dead;Non-Specific Active Suicidal Thoughts   Active Suicidal Ideation with Any Methods (Not Plan) without Intent to Act Lifetime - Yes;Past 1 month - Yes   Active Suicidal Ideation with Some Intent to Act, without Specific Plan Lifetime - Yes;Past 1 month - Yes   Active Suicidal Ideation with Specific Plan and Intent Lifetime - Yes;Past 1 month - Yes   Intensity of Ideation   Lifetime - Most Severe Ideation 3   Lifetime - Most Recent Ideation 2   Frequency 2-5 times in a week   Duration 1-4 hours/a lot of time   Controllability Can control thoughts with a lot of difficulty   Deterrents Does not apply   Reasons for Ideation Does not apply   Suicidal Behavior   Actual Attempt Lifetime - Yes;Past 3 months - Yes   Total # of Attempts 1   Has subject engaged in Non-Suicidal Self-Injurious Behavior? Lifetime - No;Past 3 months - No   Interrupted Attempt Lifetime - No;Past 3 months - No   Total # of interrupted 0   Aborted or Self-Interrupted Attempt Lifetime - No;Past 3 months - No   Total # of aborted or self-interrupted 0   Preparatory Acts or Behavior Lifetime - No;Past 3 months - No   Total # of Preparatory Acts 0   Actual Lethality/Medical Damage 1   Potential Lethality 0

## 2025-04-14 NOTE — ED NOTES
1950 - Assumed care of patient from BERE Venegas. Pt resting with no c/o at this time. He is in a green U gown. Reported pt's belonings have been secured and pt was wanded by security. Sitter present.     2005 - Pt provided with cheez-its, gingerale, and a warm blanket.

## 2025-04-14 NOTE — CARE COORDINATION
04/14/25 1540   ITP   Date of Plan 04/14/25   Date of Next Review 04/21/25   Primary Diagnosis Code Schizoaffective d/o   Barriers to Treatment Need for psychoeducation   Strengths Incorporated in Plan Acknowledging need for assistance   Plan of Care   Long Term Goal (LTG) Stated in patient/guardian terms Participate in out pt tx 12 wks   Short Term Goal 1   Short Term Goal 1 Client will remain safe during stay   Baseline Functioning SI w/plan to walk into traffic; Hx of attempts in same manner   Target mainttain safety   Objectives Client will participate in group therapy   Intervention  Assess safety   Frequency daily   Measured by Staff observation;Self report;Behavioral data   Staff Responsible Clinical staff;[facility] staff   Intervention 2 Acknowledge client strengths   Frequency daily   Measured by Staff observation;Self report;Behavioral data   Staff Responsible Clinical staff;[facility] staff   Intervention 3 Group therapy   Frequency daily   Measured by Staff observation;Self report;Behavioral data   Staff Responsible Clinical staff;[facility] staff   STG Goal 1 Status: Patient Appears to be  Progressing toward treatment plan goal   Short Term Goal 2   Short Term Goal 2 Client will maintain compliance with medication regime   Baseline Functioning ineffective or non compliant medications   Target medication adjustment and compliance   Intervention  Monitor medications   Frequency daily   Measured by Staff observation;Behavioral data   Staff Responsible Clinical staff;[facility] staff   Intervention 2 Crisis support   Frequency daily   Intervention 3 Referral to community services;Milieu therapy and support   Measured by Staff observation;Self report;Behavioral data   Staff Responsible Clinical staff;[facility] staff   STG Goal 2 Status: Patient Appears to be  Progressing toward treatment plan goal   Crisis/Safety/Discharge Plan   Comprehensive Assessment Completion Date 04/14/25   Discharge Plan Pt to discharge to [facility] with follow up care

## 2025-04-14 NOTE — BSMART NOTE
Per patient, he now only wants to be admitted at [facility]. Access advised, will let [facility] know to cancel admit order.

## 2025-04-14 NOTE — BSMART NOTE
Per Access - [phone] Foster for Dr. Hess.

## 2025-04-14 NOTE — ED NOTES
Patient informed about placement at [facility]. Patient states he does not want to go there and would like placement here. This RN spoke with BSMART. Transportation canceled.

## 2025-04-14 NOTE — ED NOTES
Patient is medically cleared for psychiatric admission.  MD Gilberto Clark Joseph, MD  04/14/25 0004

## 2025-04-14 NOTE — ED PROVIDER NOTES
ED Attending Physician Addendum    8:28 AM  Change of shift.  Care of patient transferred from Dr Hickman.  Awaiting placement.     MDM:    Pt is medically stable w/o any signs/symptoms of life threatening emergency medical conditions, no further emergency medical work-up is indicated at this time and pt is thus appropriate for inpatient psych evaluation.      EKG Interpretation  SR, narrow QRS, nl intervals, no SHANTI/STD/TWI    I personally reviewed and independently interpreted EKG, labs and imaging results.    Procedures    Admission on 04/13/2025   Component Date Value Ref Range Status    Ventricular Rate 04/13/2025 87  BPM Preliminary    Atrial Rate 04/13/2025 87  BPM Preliminary    P-R Interval 04/13/2025 162  ms Preliminary    QRS Duration 04/13/2025 88  ms Preliminary    Q-T Interval 04/13/2025 364  ms Preliminary    QTc Calculation (Bazett) 04/13/2025 438  ms Preliminary    P Axis 04/13/2025 31  degrees Preliminary    R Axis 04/13/2025 -46  degrees Preliminary    T Axis 04/13/2025 43  degrees Preliminary    Diagnosis 04/13/2025    Preliminary                    Value:Normal sinus rhythm  Left anterior fascicular block  Septal infarct (cited on or before 24-MAY-2024)  Abnormal ECG  When compared with ECG of 08-OCT-2024 13:54,  Left anterior fascicular block is now present  Nonspecific T wave abnormality, improved in Lateral leads      WBC 04/13/2025 5.4  4.1 - 11.1 K/uL Final    RBC 04/13/2025 4.63  4.10 - 5.70 M/uL Final    Hemoglobin 04/13/2025 14.3  12.1 - 17.0 g/dL Final    Hematocrit 04/13/2025 42.4  36.6 - 50.3 % Final    MCV 04/13/2025 91.6  80.0 - 99.0 FL Final    MCH 04/13/2025 30.9  26.0 - 34.0 PG Final    MCHC 04/13/2025 33.7  30.0 - 36.5 g/dL Final    RDW 04/13/2025 12.6  11.5 - 14.5 % Final    Platelets 04/13/2025 154  150 - 400 K/uL Final    MPV 04/13/2025 10.0  8.9 - 12.9 FL Final    Nucleated RBCs 04/13/2025 0.0  0  WBC Final    nRBC 04/13/2025 0.00  0.00 - 0.01 K/uL Final    Neutrophils  ng/mL  Cocaine:                     150 ng/mL  Methadone:                   300 ng/mL  Opiates:                     300 ng/mL   Phencyclidine, PCP:           25 ng/mL  Marijuana, THC:               50 ng/mL    This screening test can identify the presence of the following drugs   when above the cutoff value. A list of related and cross-reacting   compounds is locate                           d in the [facility]   Directory; Physician Resource Memos; List of Detectable Drugs.        Color, UA 04/13/2025 YELLOW/STRAW    Final    Color Reference Range: Straw, Yellow or Dark Yellow    Appearance 04/13/2025 CLEAR  CLEAR   Final    Specific Gravity, UA 04/13/2025 1.023  1.003 - 1.030   Final    pH, Urine 04/13/2025 7.0  5.0 - 8.0   Final    Protein, UA 04/13/2025 Negative  NEG mg/dL Final    Glucose, Ur 04/13/2025 Negative  NEG mg/dL Final    Ketones, Urine 04/13/2025 Negative  NEG mg/dL Final    Bilirubin, Urine 04/13/2025 Negative  NEG   Final    Blood, Urine 04/13/2025 Negative  NEG   Final    Urobilinogen, Urine 04/13/2025 0.2  0.2 - 1.0 EU/dL Final    Nitrite, Urine 04/13/2025 Negative  NEG   Final    Leukocyte Esterase, Urine 04/13/2025 Negative  NEG   Final    WBC, UA 04/13/2025 0-4  0 - 4 /hpf Final    RBC, UA 04/13/2025 0-5  0 - 5 /hpf Final    Epithelial Cells, UA 04/13/2025 FEW  FEW /lpf Final    Epithelial cell category consists of squamous cells and /or transitional urothelial cells. Renal tubular cells, if present, are separately identified as such.    BACTERIA, URINE 04/13/2025 Negative  NEG /hpf Final    Urine Culture if Indicated 04/13/2025 CULTURE NOT INDICATED BY UA RESULT  CNI   Final    Hyaline Casts, UA 04/13/2025 0-2  0 - 5 /lpf Final    Specimen HOld 04/13/2025 Urine on hold in Microbiology dept for 2 days.  If unpreserved urine is submitted, it cannot be used for addtional testing after 24 hours, recollection will be required.    Final    Source 04/13/2025

## 2025-04-14 NOTE — BSMART NOTE
Attempted to round on patient, however, he was asleep. Will check back once awake. Per access he will be accepted upstairs pending a discharge. Charge nurse, Ayanna, made aware.

## 2025-04-14 NOTE — BH NOTE
PSYCHOSOCIAL ASSESSMENT  :Patient identifying info:   Justino Preciado is a 60 y.o., male admitted 4/13/2025  6:09 PM     Presenting problem and precipitating factors: 59 yo male presented to ED due to increased depressive symptoms and SI with plan to walk into traffic. Pt reports admission last month for SI with attempt by same manner. PT reports chronic back pain. Pt has hx of anxiety, depression, schizoaffective d/o. Pt AH command in nature, telling him to hurt himself. Pt reports pain, homelessness, no enrique, and family conflicts as stressors. Pt also reports sleep disturbances. Pt denies HI, VH.     Mental status assessment: alert, oriented x's 3 - irritable , guarded     Strengths/Recreation/Coping Skills: willingness to seek treatment -    Collateral information:   He did not sign a LEONID   Current psychiatric /substance abuse providers and contact info: none    Previous psychiatric/substance abuse providers and response to treatment: previous admissions, last was 3/2025. No current medications. Previously on Paxil, Seroquel, Zyprexa, Prozac    Family history of mental illness or substance abuse: unknown     Substance abuse history:  UDS + Cocaine, BAL <10  Social History     Tobacco Use    Smoking status: Every Day     Types: Cigarettes    Smokeless tobacco: Never   Substance Use Topics    Alcohol use: Yes       History of biomedical complications associated with substance abuse: no    Patient's current acceptance of treatment or motivation for change: voluntary     Family constellation: - one child who lives in [place] - one sister who lives in [place]     Is significant other involved? No     Describe support system: none    Describe living arrangements and home environment: Homeless     GUARDIAN/POA: No    Guardian Name:     Guardian Contact:     Health issues:   Past Medical History:   Diagnosis Date    Hypertension         Trauma history: denies     Legal issues: no     History of  service:

## 2025-04-14 NOTE — BSMART NOTE
Patient was accepted to [facility] bed 748-1 by SOLO Foster on behalf of Dr. Kauffman. The number for report is ext [phone]. Patient's nurse notified.

## 2025-04-15 LAB
EKG ATRIAL RATE: 81 BPM
EKG DIAGNOSIS: NORMAL
EKG P AXIS: 33 DEGREES
EKG P-R INTERVAL: 162 MS
EKG Q-T INTERVAL: 358 MS
EKG QRS DURATION: 90 MS
EKG QTC CALCULATION (BAZETT): 415 MS
EKG R AXIS: 16 DEGREES
EKG T AXIS: 83 DEGREES
EKG VENTRICULAR RATE: 81 BPM

## 2025-04-15 PROCEDURE — 6370000000 HC RX 637 (ALT 250 FOR IP): Performed by: PSYCHIATRY & NEUROLOGY

## 2025-04-15 PROCEDURE — 93010 ELECTROCARDIOGRAM REPORT: CPT | Performed by: INTERNAL MEDICINE

## 2025-04-15 PROCEDURE — 6370000000 HC RX 637 (ALT 250 FOR IP)

## 2025-04-15 PROCEDURE — 93005 ELECTROCARDIOGRAM TRACING: CPT | Performed by: NURSE PRACTITIONER

## 2025-04-15 PROCEDURE — 1240000000 HC EMOTIONAL WELLNESS R&B

## 2025-04-15 PROCEDURE — 6370000000 HC RX 637 (ALT 250 FOR IP): Performed by: NURSE PRACTITIONER

## 2025-04-15 RX ORDER — GUAIFENESIN/DEXTROMETHORPHAN 100-10MG/5
5 SYRUP ORAL EVERY 4 HOURS PRN
Status: DISCONTINUED | OUTPATIENT
Start: 2025-04-15 | End: 2025-04-25 | Stop reason: HOSPADM

## 2025-04-15 RX ORDER — TOPIRAMATE 50 MG/1
50 TABLET, FILM COATED ORAL 2 TIMES DAILY
Status: ON HOLD | COMMUNITY
End: 2025-04-15 | Stop reason: ALTCHOICE

## 2025-04-15 RX ORDER — AMLODIPINE BESYLATE 5 MG/1
10 TABLET ORAL DAILY
Status: DISCONTINUED | OUTPATIENT
Start: 2025-04-15 | End: 2025-04-25 | Stop reason: HOSPADM

## 2025-04-15 RX ORDER — ESCITALOPRAM OXALATE 10 MG/1
5 TABLET ORAL DAILY
Status: DISCONTINUED | OUTPATIENT
Start: 2025-04-15 | End: 2025-04-16

## 2025-04-15 RX ORDER — HYDROXYZINE HYDROCHLORIDE 50 MG/1
50 TABLET, FILM COATED ORAL 3 TIMES DAILY PRN
Status: ON HOLD | COMMUNITY
End: 2025-04-25 | Stop reason: HOSPADM

## 2025-04-15 RX ORDER — ATORVASTATIN CALCIUM 20 MG/1
20 TABLET, FILM COATED ORAL DAILY
Status: ON HOLD | COMMUNITY
End: 2025-04-25

## 2025-04-15 RX ORDER — CLONIDINE HYDROCHLORIDE 0.2 MG/1
0.2 TABLET ORAL ONCE
Status: COMPLETED | OUTPATIENT
Start: 2025-04-15 | End: 2025-04-15

## 2025-04-15 RX ORDER — LISINOPRIL 10 MG/1
10 TABLET ORAL DAILY
Status: DISCONTINUED | OUTPATIENT
Start: 2025-04-15 | End: 2025-04-16

## 2025-04-15 RX ORDER — PRAZOSIN HYDROCHLORIDE 1 MG/1
1 CAPSULE ORAL NIGHTLY
Status: DISCONTINUED | OUTPATIENT
Start: 2025-04-15 | End: 2025-04-16

## 2025-04-15 RX ORDER — ATORVASTATIN CALCIUM 20 MG/1
20 TABLET, FILM COATED ORAL DAILY
Status: DISCONTINUED | OUTPATIENT
Start: 2025-04-15 | End: 2025-04-25 | Stop reason: HOSPADM

## 2025-04-15 RX ORDER — LISINOPRIL 10 MG/1
10 TABLET ORAL DAILY
Status: ON HOLD | COMMUNITY
End: 2025-04-25 | Stop reason: HOSPADM

## 2025-04-15 RX ORDER — AMLODIPINE BESYLATE 10 MG/1
10 TABLET ORAL DAILY
Status: ON HOLD | COMMUNITY
End: 2025-04-25

## 2025-04-15 RX ADMIN — AMLODIPINE BESYLATE 10 MG: 5 TABLET ORAL at 11:06

## 2025-04-15 RX ADMIN — ESCITALOPRAM OXALATE 5 MG: 10 TABLET ORAL at 11:05

## 2025-04-15 RX ADMIN — Medication 1 LOZENGE: at 09:11

## 2025-04-15 RX ADMIN — GUAIFENESIN AND DEXTROMETHORPHAN HYDROBROMIDE 1 TABLET: 600; 30 TABLET, EXTENDED RELEASE ORAL at 04:51

## 2025-04-15 RX ADMIN — CLONIDINE HYDROCHLORIDE 0.2 MG: 0.2 TABLET ORAL at 13:40

## 2025-04-15 RX ADMIN — LISINOPRIL 10 MG: 10 TABLET ORAL at 11:06

## 2025-04-15 RX ADMIN — GUAIFENESIN AND DEXTROMETHORPHAN 5 ML: 100; 10 SYRUP ORAL at 17:53

## 2025-04-15 RX ADMIN — ATORVASTATIN CALCIUM 20 MG: 20 TABLET, FILM COATED ORAL at 11:06

## 2025-04-15 ASSESSMENT — PAIN SCALES - GENERAL: PAINLEVEL_OUTOF10: 2

## 2025-04-15 ASSESSMENT — PAIN SCALES - WONG BAKER: WONGBAKER_NUMERICALRESPONSE: HURTS A LITTLE BIT

## 2025-04-15 NOTE — BH NOTE
GROUP THERAPY PROGRESS NOTE  Pt participated in Spirituality group  Katherine Gillies, MSW, [facility]

## 2025-04-15 NOTE — BH NOTE
GROUP THERAPY PROGRESS NOTE    Patient is participating in Community group.    Group time: 10 minutes    Personal goal for participation: to provide pts with information about unit protocol/procedures.     Goal orientation: Personal    Group therapy participation: Active     Therapeutic interventions reviewed and discussed: Members were able to gain an understanding of unit operations. FAQ sheet provided.     Impression of participation: SW provided FAQ sheet to pts and addressed questions and/or concerns about unit protocols     Katherine Gillies MSW, [facility]

## 2025-04-15 NOTE — INTERDISCIPLINARY ROUNDS
Behavioral Health Interdisciplinary Rounds     Patient Name: Justino Preciado  Age: 60 y.o.  Room/Bed:  [facility]  Primary Diagnosis: Schizoaffective disorder (HCC)   Admission Status: Voluntary     Readmission within 30 days:   Power of  in place:   Patient requires a blocked bed: No          Reason for blocked bed: N/A  Sleep hours: 6+  Flu vaccine : not received this season      Participation in Care/Groups:  Yes  Medication Compliant?:  Medication Compliant: Yes  PRNS (last 24 hours):  PRNS: Antianxiety    Restraints (last 24 hours):  NO  __________________________________________________  OQ Admission Analysis Survey completed:no  OQ Admission Analysis Survey score:    __________________________________________________     Alcohol screening (AUDIT) completed -  yes    Score: 1  Tobacco :yes   Illegal Drugs use: yes / cocaine  CSSR Lifetime: yes     24 hour chart check complete: Yes    _______________________________________________    Patient goal(s) for today: meet with treatment team   Treatment team focus/goals: Plan to assess for medications and discharge needs   Progress note: He reports being homeless and not working has been a stressor, No family support , just went to [facility] and was not admitted, he was admitted  to [facility] last month , but did not take medications , and did not follow up      Spiritual Care Consult:   Financial concerns/prescription coverage: ?  / no insurance   Family contact: no family support                        Family requesting physician contact today:    Discharge plan: he is homeless   Access to weapons : no                                                             Outpatient provider(s): refer to [facility]     LOS:  1  Expected LOS: TBD     Participating treatment team members: Umm Stone SW- Dr. Jamari Reyes, PharmD. - Sheila Reina RN

## 2025-04-15 NOTE — BH NOTE
PRN Medication Documentation    Specific patient behavior that led to need for PRN medication: Sore throat  Staff interventions attempted prior to PRN being given: Ginger ale  PRN medication given: Cepacol  Patient response/effectiveness of PRN medication: NAIMA loomis

## 2025-04-15 NOTE — PROGRESS NOTES
Spiritual Health History and Assessment/Progress Note  [facility]    Spiritual/Emotional Needs,  ,  , Spirituality Group    Name: Justino Preciado MRN: [id number]    Age: 60 y.o.     Sex: male   Language: English   Spiritism: Protestant   Schizoaffective disorder (HCC)     Date: 4/15/2025            Total Time Calculated: 50 min              Spiritual Assessment began in [facility]        Referral/Consult From: Patient   Encounter Overview/Reason: Spiritual/Emotional Needs  Service Provided For: Patient    Marleen, Belief, Meaning:   Patient is connected with a marleen tradition or spiritual practice  Family/Friends       Importance and Influence:  Patient has no beliefs influential to healthcare decision-making identified during this visit  Family/Friends     Community:  Patient expresses feelings of isolation: disconnected from family/friends, feeling there is no one to turn to for help, and feeling no one understands  Family/Friends     Assessment and Plan of Care:   Spiritual Distress: loneliness/isolation; loss of marleen; hopeless; questioning worth; suicidal with plan  Emotional Distress: experiencing homelessness for 2 years; financial insecurity; lack of support system  Pt reports that he moved to [facility] from [place] to live with his biological sister whom he was getting acquainted with after a 50 yr gap. They were both adopted. He is now struggling with regrets over this decision, as they were unable to rebuild a relationship. Pt has been homeless and unstable to the point where he wanted to end his life. Pt reports that he is Protestant, but has lost his marleen over this situation. Pt has a daughter in [place], but does not want to involve her because he feels embarrassed about his current situation.     I offered compassionate listening, explored spiritual strengths, and words of encouragement/hope. Will continue to follow for spiritual care.    Patient Interventions include: Facilitated expression of thoughts and

## 2025-04-15 NOTE — PLAN OF CARE
Problem: Depression  Goal: Will be euthymic at discharge  Description: INTERVENTIONS:1. Administer medication as ordered2. Provide emotional support via 1:1 interaction with staff3. Encourage involvement in milieu/groups/activities4. Monitor for social isolation  Outcome: Progressing  Note: Out on unit irritable, voicing frustration with unit routine and sharing common spaces. Challenging of nursing direction and unit routine. Required limit setting and prompts to follow unit routine. Depressed mood with hopelessness and passive SI with no plan. Review social stressors, lack of finances and support. Minimizing and deflecting from cocaine use. Pt verbalized understanding of medications ordered. Staff focus is on offering support

## 2025-04-15 NOTE — BH NOTE
GROUP THERAPY PROGRESS NOTE    Patient did not participate in Self-Care group.    Katherine Gillies MSW, [facility]

## 2025-04-15 NOTE — DISCHARGE INSTRUCTIONS
DISCHARGE SUMMARY    NAME:Justino Preciado  : 1965  MRN: [id number]    The patient Justino Preciado exhibits the ability to control behavior in a less restrictive environment.  Patient's level of functioning is improving.  No assaultive/destructive behavior has been observed for the past 24 hours.  No suicidal/homicidal threat or behavior has been observed for the past 24 hours.  There is no evidence of serious medication side effects.  Patient has not been in physical or protective restraints for at least the past 24 hours.    If weapons involved, how are they secured? none    Is patient aware of and in agreement with discharge plan? yes    Arrangements for medication:  Prescriptions filled here    Copy of discharge instructions to provider?:  yes faxed to [facility]     Arrangements for transportation home:  [place]    Keep all follow up appointments as scheduled, continue to take prescribed medications per physician instructions.  Mental health crisis number:  911 or your local mental health crisis line number at [phone]      Mental Health Emergency WARM LINE      [phone] ([phone])      M-F: 9am to 9pm      Sat & Sun: 5pm - 9pm  National suicide prevention lines:                             2-374-BIJVJIU (5-611-693-9291)       9-720-366-TALK ([phone])    Crisis Text Line:  Text HOME to 847681        DISCHARGE SUMMARY from Nurse    PATIENT INSTRUCTIONS:      What to do at Home:  Recommended activity: activity as tolerated,     If you experience any of the following symptoms: racing or intrusive thoughts, uncontrolled anxiety, urges to engage in self harm and high risk behaviors - talk with your support system including staff at [facility]    *  Please give a list of your current medications to your Primary Care Provider.    *  Please update this list whenever your medications are discontinued, doses are      changed, or new medications (including  over-the-counter products) are added.    *  Please carry medication information at all times in case of emergency situations.    These are general instructions for a healthy lifestyle:    No smoking/ No tobacco products/ Avoid exposure to second hand smoke  Surgeon General's Warning:  Quitting smoking now greatly reduces serious risk to your health.    Obesity, smoking, and sedentary lifestyle greatly increases your risk for illness    A healthy diet, regular physical exercise & weight monitoring are important for maintaining a healthy lifestyle    You may be retaining fluid if you have a history of heart failure or if you experience any of the following symptoms:  Weight gain of 3 pounds or more overnight or 5 pounds in a week, increased swelling in our hands or feet or shortness of breath while lying flat in bed.  Please call your doctor as soon as you notice any of these symptoms; do not wait until your next office visit.        The discharge information has been reviewed with the patient.  The patient verbalized understanding.  Discharge medications reviewed with the patient and appropriate educational materials and side effects teaching were provided.  ___________________________________________________________________________________________________________________________________

## 2025-04-15 NOTE — PLAN OF CARE
Pt is resting in bed with eyes closed, appears to be sleeping. Respirations are even and unlabored, NAD. Safety measures are in place and Q 15 minute rounds are done.   Problem: Safety - Adult  Goal: Free from fall injury  Outcome: Progressing

## 2025-04-15 NOTE — H&P
PSYCHIATRY EVALUATION NOTE    CHIEF COMPLAINT: 'i've been bipolar for years.'    HISTORY OF PRESENTING COMPLAINT:  Justino Preciado is a 60 y.o. White (non-) male w/pmh of HTN, HLD, and past psychiatric history of MDD, recurrent, severe, without psychosis, and cocaine use disorder presented to [facility] on 4/13/2025 who presented for worsening suicidal thoughts and plans to end his life.    Justino says that he's been struggling with depressive symptoms ever since he moved from [place], and has been attempting to end his life. He reports having walked in front of a bus. He came to hospital as a last resort before he attempts to end his life. He reports being depressed, experiencing recurrent nightmares and inability to motivate. He's been without resources and living in his care for the past 6 months.     He says that he's been diagnosed with bipolar disorder since birth, but his symptoms are consistent with depressive disorder. Review of psychiatric symptoms is negative for prerna or psychosis.    No access to fire-arms.    PAST PSYCHIATRIC HISTORY   1 past inpatient [facility], [facility]  1 suicide attempts, last being by walking in front of a bus.    SUBSTANCE USE HISTORY:  Tobacco: intermittent  Cannabis: none  Alcohol: rare  IVD: none  Cocaine intermittently.    PAST MEDICAL HISTORY:    Please see H&P for details.     Past Medical History:   Diagnosis Date    Hypertension      Prior to Admission medications    Not on File     Lab Results   Component Value Date    WBC 5.4 04/13/2025    HGB 14.3 04/13/2025    HCT 42.4 04/13/2025    MCV 91.6 04/13/2025     04/13/2025     Lab Results   Component Value Date     04/13/2025    K 3.9 04/13/2025     (H) 04/13/2025    CO2 29 04/13/2025    BUN 11 04/13/2025    CREATININE 0.85 04/13/2025    GLUCOSE 100 04/13/2025    CALCIUM 10.2 (H) 04/13/2025    BILITOT 0.4 04/13/2025    ALKPHOS 103 04/13/2025    AST 16 04/13/2025    ALT 19 04/13/2025     collateral information.Encourage patient to participate in programming and group activities.  Medication Regimen As follows:  start lexapro 5mg po qday.  Start prazosin 1mg po qhs  Start lisinporil, amlodipine, and atorvastatin.  Discussed 3-5 inpatient psychiatric admission.    I certify that this patients inpatient psychiatric hospital services furnished since the previous certification were, and continue to be, required for treatment that could reasonably be expected to improve the patient's condition, or for diagnostic study, and that the patient continues to need, on a daily basis, active treatment furnished directly by or requiring the supervision of inpatient psychiatric facility personnel. In addition, the hospital records show that services furnished were intensive treatment services, admission or related services, or equivalent services.      A coordinated, multidisplinary treatment team round was conducted with the patient, nurses, pharmcist,  and writer present. Discussions held with , and/or with family members; Complete current electronic health record for patient was reviewed in full including consultant notes, ancillary staff notes, nurses and tech notes, labs and vitals.

## 2025-04-15 NOTE — BH NOTE
GROUP THERAPY PROGRESS NOTE    Patient is participating in recreational therapy group.    Group time: 30 minutes    Personal goal for participation: To engage in “the trivia game” activity.    Goal orientation: Personal    Group therapy participation:  Active     Therapeutic interventions reviewed and discussed:  Group members were given the opportunity to engage in the “trivia game” activity. Members were able to exercise socialization and memory skills. Members interacted with peers.     Impression of participation: Pt was present and engaged in group activity and discussion. Pt interacted well with SW and peers.       Katherine Gillies, MSW, [facility]

## 2025-04-15 NOTE — PROGRESS NOTES
SPIRITUALITY GROUP      Meeting Topic: Methods of Spiritual Pathways    Meeting Time:  45-60 minutes    Meeting Goal: Patients will describe their personal definition of spirituality. The group will identify how their spiritual or Anabaptist beliefs are relevant to their mental health. The  will discuss various approaches to making spiritual connections and invite the group to explore a new spiritual practice. The group will conclude with a guided meditation.     Today's meeting focus: Gratitude    Justino Preciado attended and participated in the group appropriately.        For additional spiritual care, please contact the  on-call at ([phone]).    Jessica Beltre MDiv, MS, [facility]  Staff   Spiritual Health Services

## 2025-04-15 NOTE — BH NOTE
PRN Medication Documentation    Specific patient behavior that led to need for PRN medication: Cold  Staff interventions attempted prior to PRN being given: Rest, water  PRN medication given: Robitussin DM generic  Patient response/effectiveness of PRN medication: NAIMA aware

## 2025-04-15 NOTE — PROGRESS NOTES
Behavioral Services  Medicare Certification Upon Admission    I certify that this patient's inpatient psychiatric hospital admission is medically necessary for:    [x] (1) Treatment which could reasonably be expected to improve this patient's condition,       [] (2) Or for diagnostic study;     AND     [x](2) The inpatient psychiatric services are provided while the individual is under the care of a physician and are included in the individualized plan of care.    Estimated length of stay/service 3-5 days.    Plan for post-hospital care outpatient psychiatry.    Electronically signed by Huber Kauffman MD on 4/15/2025 at 10:11 AM

## 2025-04-15 NOTE — PROGRESS NOTES
Admission Medication Reconciliation:    Information obtained from:  patient interview, Insurance claims data, review of EMR, and [facility]  RxQuery data available¹:  YES    Comments/Recommendations: Updated PTA meds/reviewed patient's allergies.    1)  The patient denies taking his psychiatric medications prior to admission as they made him feel anxious and have difficulty sleeping. He does endorse taking his blood pressure and cholesterol regularly. He was recently hospitalized at [facility] psychiatry 3/18/25-3/25/25 and discharged on the following medications: fluoxetine, topiramate (for cocaine use), hydroxyzine, lisinopril, atorvastatin and amlodipine. Updated the PTA medications list. Patient states that he \"drinks\" and \"tried cocaine before but its nothing regular.\" Of note, the patient's BAL was 0 and UDS positive for cocaine.     2)  The [place] Prescription Monitoring Program () was assessed to determine fill history of any controlled medications. The patient has NOT filled any controlled medications in the last  2 years.    3)  Medication changes (since last review):  Added  - fluoxetine 20 mg daily  - lisinopril 10 mg daily  - atorvastatin 20 mg daily  - topiramate 50 mg BID    Removed  - famotidine, losartan, naproxen   ¹RxQuery pharmacy benefit data reflects medications filled and processed through the patient's insurance, however this data does NOT capture whether the medication was picked up or is currently being taken by the patient.    Allergies:  Patient has no known allergies.    Significant PMH/Disease States:   Past Medical History:   Diagnosis Date    Hypertension        Chief Complaint for this Admission:    Chief Complaint   Patient presents with    Suicidal     Prior to Admission Medications:   Prior to Admission Medications   Prescriptions Last Dose Informant   FLUoxetine (PROZAC) 20 MG capsule Not Taking    Sig: Take 1 capsule by mouth daily   Patient not taking: Reported on 4/15/2025

## 2025-04-15 NOTE — BH NOTE
GROUP THERAPY PROGRESS NOTE    Patient is participating in Expressions group.     Group time: 30 minutes    Personal goal for participation: To engage with SW and peers about likes/dislikes/needs on unit     Goal orientation: Personal    Group therapy participation: Active      Therapeutic interventions reviewed and discussed: Group allows members to interact with each other and SW to discuss concerns on unit     Impression of participation: Pt was present and engaged in group. Pt interacted with peers and .     Katherine Gillies MSW, [facility]-A

## 2025-04-15 NOTE — PLAN OF CARE
Problem: Depression  Goal: Will be euthymic at discharge  Description: INTERVENTIONS:1. Administer medication as ordered2. Provide emotional support via 1:1 interaction with staff3. Encourage involvement in milieu/groups/activities4. Monitor for social isolation  4/15/2025 1613 by Morgan Lowe RN  Outcome: Progressing     Problem: Drug Abuse/Detox  Goal: Will have no detox symptoms and will verbalize plan for changing drug-related behavior  Description: INTERVENTIONS:1. Administer medication as ordered2. Monitor physical status3. Provide emotional support with 1:1 interaction with staff4. Encourage  recovery focused treatment   Outcome: Progressing     Pt received resting in his room. Endorses passive SI with plan but denies HI/AVH. Irritable regarding unit routine policies and interactions with peer and staff. Feels he is 'being treated like a child.' Required limit setting and explanation of unit rules. Med and meal compliant. Q15 minute safety rounds continue.

## 2025-04-15 NOTE — BH NOTE
GROUP THERAPY PROGRESS NOTE    Patient is participating in psychoeducation group.    Group time: 45 mins.    Personal goal for participation: to develop an understanding of Cognitive Behavioral Therapy (CBT)    Goal orientation: Personal    Group therapy participation:  Active      Therapeutic interventions reviewed and discussed:  Group members were guided through learning about CBT through discussion and video. Members gained an understanding of the cognitive triangle and how our thoughts about situations determine our feelings and behaviors. Handouts provided.     Impression of participation:  Pt was present and engaged in group discussion. Pt added insight to group topic. Pt was calm, cooperative.     Katherine Gillies, MSW, [facility]-A

## 2025-04-16 PROCEDURE — 1240000000 HC EMOTIONAL WELLNESS R&B

## 2025-04-16 PROCEDURE — 6370000000 HC RX 637 (ALT 250 FOR IP): Performed by: PSYCHIATRY & NEUROLOGY

## 2025-04-16 PROCEDURE — 6370000000 HC RX 637 (ALT 250 FOR IP)

## 2025-04-16 RX ORDER — PRAZOSIN HYDROCHLORIDE 1 MG/1
2 CAPSULE ORAL NIGHTLY
Status: DISCONTINUED | OUTPATIENT
Start: 2025-04-16 | End: 2025-04-18

## 2025-04-16 RX ORDER — ESCITALOPRAM OXALATE 10 MG/1
10 TABLET ORAL DAILY
Status: DISCONTINUED | OUTPATIENT
Start: 2025-04-17 | End: 2025-04-21

## 2025-04-16 RX ORDER — LISINOPRIL 20 MG/1
20 TABLET ORAL DAILY
Status: DISCONTINUED | OUTPATIENT
Start: 2025-04-17 | End: 2025-04-18

## 2025-04-16 RX ORDER — CLONIDINE HYDROCHLORIDE 0.1 MG/1
0.1 TABLET ORAL EVERY 6 HOURS PRN
Status: DISCONTINUED | OUTPATIENT
Start: 2025-04-16 | End: 2025-04-25 | Stop reason: HOSPADM

## 2025-04-16 RX ADMIN — PRAZOSIN HYDROCHLORIDE 1 MG: 1 CAPSULE ORAL at 01:07

## 2025-04-16 RX ADMIN — LISINOPRIL 10 MG: 10 TABLET ORAL at 09:08

## 2025-04-16 RX ADMIN — GUAIFENESIN AND DEXTROMETHORPHAN 5 ML: 100; 10 SYRUP ORAL at 09:45

## 2025-04-16 RX ADMIN — AMLODIPINE BESYLATE 10 MG: 5 TABLET ORAL at 09:08

## 2025-04-16 RX ADMIN — ATORVASTATIN CALCIUM 20 MG: 20 TABLET, FILM COATED ORAL at 09:08

## 2025-04-16 RX ADMIN — ESCITALOPRAM OXALATE 5 MG: 10 TABLET ORAL at 09:08

## 2025-04-16 RX ADMIN — CLONIDINE HYDROCHLORIDE 0.1 MG: 0.1 TABLET ORAL at 15:55

## 2025-04-16 RX ADMIN — PRAZOSIN HYDROCHLORIDE 2 MG: 1 CAPSULE ORAL at 20:46

## 2025-04-16 NOTE — BH NOTE
GROUP THERAPY PROGRESS NOTE    Patient is participating in psychoeducation group.    Group time: 30 minutes    Personal goal for participation: To gain an understanding of the 12 basic irrational beliefs and identify personal interpretations as they apply.    Goal orientation: Personal    Group therapy participation: Active     Therapeutic interventions reviewed and discussed:  Group members were guided through developing an understanding of irrational beliefs and how they affect thought processes and behaviors. Members completed and activity and then engaged in discussion. Handouts provided.    Impression of participation: Pt was present and engaged in group discussion. Pt added insight to topic. Pt was calm, cooperative.       Katherine Gillies, MSW, [facility]-A

## 2025-04-16 NOTE — FLOWSHEET NOTE
04/16/25 1430   Vital Signs   BP (!) 171/93   MAP (Calculated) 119     Dr. Kauffman notified of pt's continued elevated BP of 171/93. Received orders for prn clonidine. PRN clonidine given PO. Will monitor

## 2025-04-16 NOTE — PROGRESS NOTES
PSYCHIATRIC PROGRESS NOTE    Chief Complaint:      Length of Stay: 2 Days    Interval History:  Patient says that his sleep was poor. He wasn't able to sleep until quite late in the morning.  He tolerated starting lexapro 5mg po yesterday. He also benefited from starting prazosin. Justino experiences recurrent thoughts of suicide, but feels they are decreasing.  No psychotic symptoms.      Past Medical History:  Past Medical History:   Diagnosis Date    Hypertension           amLODIPine  10 mg Oral Daily    atorvastatin  20 mg Oral Daily    lisinopril  10 mg Oral Daily    escitalopram  5 mg Oral Daily    prazosin  1 mg Oral Nightly       Labs:  Lab Results   Component Value Date/Time    WBC 5.4 04/13/2025 06:22 PM    HGB 14.3 04/13/2025 06:22 PM    HCT 42.4 04/13/2025 06:22 PM     04/13/2025 06:22 PM    MCV 91.6 04/13/2025 06:22 PM      Lab Results   Component Value Date/Time     04/13/2025 06:22 PM    K 3.9 04/13/2025 06:22 PM     04/13/2025 06:22 PM    CO2 29 04/13/2025 06:22 PM    BUN 11 04/13/2025 06:22 PM    GLOB 3.8 04/13/2025 06:22 PM    ALT 19 04/13/2025 06:22 PM          Vitals:    04/16/25 0908   BP: (!) 150/99   Pulse:    Resp:    Temp:    SpO2:         Physical Exam:  Body habitus: Body mass index is 31.82 kg/m².  Musculoskeletal system: normal gait  Tremor - neg  Cog wheeling - neg    Mental Status Exam:  Mr Preciado is a 60 y.o. YO male w/gray hair is dressed in hospital gown. He is engaged in the evaluation and maintains fair eye contact.  Patient maintains eye contact throughout the evaluation  Psychomotor activity: WNL  Speech is spontaneous, with NL volume and prosody  Thought process is linear  Mood is reported as \"okay .\" Affect is congruent, dysthymic  Passive death wishes. No rhonda suicidal intent or plan.  Denies experiencing hallucinations of any type  Perception is negative for paranoia or delusional thoughts  Insight/Judgment are both poor    Assessment and Plan:  Justino  Ilana meets criteria for a diagnosis of   MDD, Recurrent, severe, without psychosis   Cocaine use d/o    Increase lisinopril from 10 to 20mg po qday  Increase lexapro from 5 to 10mg po qday  Increase prazosin from 1 to 2mg po qhs.    A coordinated, multidisplinary treatment team round was conducted with the patient, nurses, pharmcist,  and writer present. Discussions held with , and/or with family members; Complete current electronic health record for patient was reviewed in full including consultant notes, ancillary staff notes, nurses and tech notes, labs and vitals.    I certify that this patients inpatient psychiatric hospital services furnished since the previous certification were, and continue to be, required for treatment that could reasonably be expected to improve the patient's condition, or for diagnostic study, and that the patient continues to need, on a daily basis, active treatment furnished directly by or requiring the supervision of inpatient psychiatric facility personnel. In addition, the hospital records show that services furnished were intensive treatment services, admission or related services, or equivalent services.

## 2025-04-16 NOTE — BH NOTE
GROUP THERAPY PROGRESS NOTE    Patient is participating in recreational therapy group.    Group time: 30 minutes    Personal goal for participation: To engage in “keep it going” ball game. To increase physical activity, promote socialization, improve hand/eye coordination.     Goal orientation: Personal    Group therapy participation: active    Therapeutic interventions reviewed and discussed:  Group members were given the opportunity to engage in the “keep it going” ball game. Members were able to participate in an activity that promotes healthy outlet while interacting and engaging in discussion.    Impression of participation: Pt was present and engaged in group activity.       Katherine Gillies, MSW, [facility]

## 2025-04-16 NOTE — PROGRESS NOTES
Attended Treatment team on  where patient's care was discussed.    Jean Rooney  Chaplain Resident  [phone]

## 2025-04-16 NOTE — PLAN OF CARE
Problem: Safety - Adult  Goal: Free from fall injury  4/16/2025 1115 by Morgan Lowe RN  Outcome: Progressing     Problem: Depression  Goal: Will be euthymic at discharge  Description: INTERVENTIONS:1. Administer medication as ordered2. Provide emotional support via 1:1 interaction with staff3. Encourage involvement in milieu/groups/activities4. Monitor for social isolation  Outcome: Progressing     Pt received in treatment team. Denies HI/AVH, endorses decreasing passive SI and sleep disturbance. Tolerating medication changes well. Med and meal compliant, cooperative with staff. Q15 minute safety rounds continue.

## 2025-04-16 NOTE — INTERDISCIPLINARY ROUNDS
Behavioral Health Interdisciplinary Rounds     Patient Name: Justino Preciado  Age: 60 y.o.  Room/Bed:  [facility]  Primary Diagnosis: Schizoaffective disorder (HCC)   Admission Status: Voluntary    Readmission within 30 days: No  Power of  in place: No  Patient requires a blocked bed: No          Reason for blocked bed:   Sleep hours:        Flu Vaccine:   Participation in Care/Groups:  Yes  Medication Compliant?: Yes  PRNS (last 24 hours):  cepacol.     Restraints (last 24 hours):  No  __________________________________________________  OQ Admission Analysis Survey completed:  OQ Admission Analysis Survey score:  __________________________________________________     Alcohol screening (AUDIT) completed -     If applicable, date SBIRT discussed in treatment team AND documented:    Tobacco - patient is a smoker:    Illegal Drugs use:      24 hour chart check complete: Yes    _______________________________________________    Patient goal(s) for today:   Treatment team focus/goals:   Progress note:      Spiritual Care Consult:   Financial concerns/prescription coverage:    Family contact:                        Family requesting physician contact today:    Discharge plan:   Access to weapons :                                                              Outpatient provider(s):   Patient's preferred phone number for follow up call :   Patient's preferred e-mail address :    LOS:  2  Expected LOS:     Participating treatment team members: Justino Ilana, * (assigned SW),

## 2025-04-16 NOTE — BH NOTE
GROUP THERAPY PROGRESS NOTE    Patient is participating in Coping Skills group.    Group time: 30 minutes    Personal goal for participation: To gain an understanding of the importance of self-awareness.    Goal orientation: Personal    Group therapy participation: Active     Therapeutic interventions reviewed and discussed:  Group members were guided through developing an understanding of how self-awareness contributes to our ability to cope with life stressors. Members engaged in discussion about current Self-awareness. Group members had the opportunity to participate in various personality assessments.    Impression of participation: Pt was present and engaged in group discussion. Pt added insight to group topic. Pt was calm, cooperative.     Katherine Gillies, MSW, [facility]

## 2025-04-17 PROCEDURE — 1240000000 HC EMOTIONAL WELLNESS R&B

## 2025-04-17 PROCEDURE — 6370000000 HC RX 637 (ALT 250 FOR IP): Performed by: PSYCHIATRY & NEUROLOGY

## 2025-04-17 PROCEDURE — 6370000000 HC RX 637 (ALT 250 FOR IP): Performed by: NURSE PRACTITIONER

## 2025-04-17 RX ORDER — TRAZODONE HYDROCHLORIDE 100 MG/1
100 TABLET ORAL NIGHTLY PRN
Status: DISCONTINUED | OUTPATIENT
Start: 2025-04-17 | End: 2025-04-20

## 2025-04-17 RX ORDER — CETIRIZINE HYDROCHLORIDE 10 MG/1
10 TABLET ORAL DAILY
Status: DISCONTINUED | OUTPATIENT
Start: 2025-04-17 | End: 2025-04-25 | Stop reason: HOSPADM

## 2025-04-17 RX ORDER — TRAZODONE HYDROCHLORIDE 100 MG/1
100 TABLET ORAL NIGHTLY
Status: DISCONTINUED | OUTPATIENT
Start: 2025-04-17 | End: 2025-04-18

## 2025-04-17 RX ADMIN — ESCITALOPRAM OXALATE 10 MG: 10 TABLET ORAL at 08:50

## 2025-04-17 RX ADMIN — LISINOPRIL 20 MG: 20 TABLET ORAL at 08:51

## 2025-04-17 RX ADMIN — TRAZODONE HYDROCHLORIDE 100 MG: 100 TABLET ORAL at 20:49

## 2025-04-17 RX ADMIN — TRAZODONE HYDROCHLORIDE 50 MG: 50 TABLET ORAL at 01:52

## 2025-04-17 RX ADMIN — ATORVASTATIN CALCIUM 20 MG: 20 TABLET, FILM COATED ORAL at 08:51

## 2025-04-17 RX ADMIN — CETIRIZINE HYDROCHLORIDE 10 MG: 10 TABLET, FILM COATED ORAL at 16:03

## 2025-04-17 RX ADMIN — AMLODIPINE BESYLATE 10 MG: 5 TABLET ORAL at 08:50

## 2025-04-17 RX ADMIN — ACETAMINOPHEN 650 MG: 325 TABLET ORAL at 20:51

## 2025-04-17 RX ADMIN — HYDROXYZINE HYDROCHLORIDE 50 MG: 50 TABLET, FILM COATED ORAL at 08:52

## 2025-04-17 ASSESSMENT — PAIN - FUNCTIONAL ASSESSMENT: PAIN_FUNCTIONAL_ASSESSMENT: ACTIVITIES ARE NOT PREVENTED

## 2025-04-17 ASSESSMENT — PAIN DESCRIPTION - ORIENTATION: ORIENTATION: LOWER

## 2025-04-17 ASSESSMENT — PAIN SCALES - GENERAL
PAINLEVEL_OUTOF10: 0
PAINLEVEL_OUTOF10: 3

## 2025-04-17 ASSESSMENT — PAIN SCALES - WONG BAKER: WONGBAKER_NUMERICALRESPONSE: NO HURT

## 2025-04-17 ASSESSMENT — PAIN DESCRIPTION - DESCRIPTORS: DESCRIPTORS: ACHING

## 2025-04-17 ASSESSMENT — PAIN DESCRIPTION - LOCATION: LOCATION: BACK

## 2025-04-17 NOTE — PLAN OF CARE
Problem: ABCDS Injury Assessment  Goal: Absence of physical injury  Outcome: Progressing  Flowsheets (Taken 4/16/2025 7967)  Absence of Physical Injury: Implement safety measures based on patient assessment  Note: Pt resting quietly in bed with eyes closed. No signs of respiratory distress. Safety measures in place. Will continue to monitor q15m rounds.

## 2025-04-17 NOTE — PROGRESS NOTES
PSYCHIATRIC PROGRESS NOTE    Chief Complaint: 'i'm not getting better.'    Length of Stay: 3 Days    Interval History:  04/17/2025  Nursing report patient had poor sleep last night.  Justino says that he is attending in groups to benefit his mental health. He is feeling tired today and describe struggling with ruminations about his past. He feels hopeless and helpless, but wants to do his part. He was wondering if medication changes have to do with his worsening sleep.  He says trazodone last night did help him fall asleep, but wasn't able to stay asleep long.    04/16/2025  Patient says that his sleep was poor. He wasn't able to sleep until quite late in the morning.  He tolerated starting lexapro 5mg po yesterday. He also benefited from starting prazosin. Justino experiences recurrent thoughts of suicide, but feels they are decreasing.  No psychotic symptoms.      Past Medical History:  Past Medical History:   Diagnosis Date    Hypertension           cetirizine  10 mg Oral Daily    traZODone  100 mg Oral Nightly    escitalopram  10 mg Oral Daily    lisinopril  20 mg Oral Daily    prazosin  2 mg Oral Nightly    amLODIPine  10 mg Oral Daily    atorvastatin  20 mg Oral Daily       Labs:  Lab Results   Component Value Date/Time    WBC 5.4 04/13/2025 06:22 PM    HGB 14.3 04/13/2025 06:22 PM    HCT 42.4 04/13/2025 06:22 PM     04/13/2025 06:22 PM    MCV 91.6 04/13/2025 06:22 PM      Lab Results   Component Value Date/Time     04/13/2025 06:22 PM    K 3.9 04/13/2025 06:22 PM     04/13/2025 06:22 PM    CO2 29 04/13/2025 06:22 PM    BUN 11 04/13/2025 06:22 PM    GLOB 3.8 04/13/2025 06:22 PM    ALT 19 04/13/2025 06:22 PM          Vitals:    04/17/25 0753   BP: (!) 167/98   Pulse: 66   Resp: 16   Temp: 97.7 °F (36.5 °C)   SpO2: 99%        Physical Exam:  Body habitus: Body mass index is 31.82 kg/m².  Musculoskeletal system: normal gait  Tremor - neg  Cog wheeling - neg    Mental Status Exam:  Mr Preciado is a  60 y.o. YO male w/gray hair is dressed in hospital gown. He is engaged in the evaluation and maintains fair eye contact.  Patient maintains eye contact throughout the evaluation  Psychomotor activity: WNL  Speech is spontaneous, with NL volume and prosody  Thought process is linear  Mood is reported as \"okay .\" Affect is congruent, dysthymic  Passive death wishes. No rhonda suicidal intent or plan.  Denies experiencing hallucinations of any type  Perception is negative for paranoia or delusional thoughts  Insight/Judgment are both poor    Assessment and Plan:  Justino Preciado meets criteria for a diagnosis of   MDD, Recurrent, severe, without psychosis   Cocaine use d/o    04/17/2025  Schedule trazodone 100mg po qhs  Added trazodone 100mg po qhs prn insomnia.  Started zyrtec 10mg po qhs.    04/16/2025  Increase lisinopril from 10 to 20mg po qday  Increase lexapro from 5 to 10mg po qday  Increase prazosin from 1 to 2mg po qhs.    A coordinated, multidisplinary treatment team round was conducted with the patient, nurses, pharmcist,  and writer present. Discussions held with , and/or with family members; Complete current electronic health record for patient was reviewed in full including consultant notes, ancillary staff notes, nurses and tech notes, labs and vitals.    I certify that this patients inpatient psychiatric hospital services furnished since the previous certification were, and continue to be, required for treatment that could reasonably be expected to improve the patient's condition, or for diagnostic study, and that the patient continues to need, on a daily basis, active treatment furnished directly by or requiring the supervision of inpatient psychiatric facility personnel. In addition, the hospital records show that services furnished were intensive treatment services, admission or related services, or equivalent services.

## 2025-04-17 NOTE — PLAN OF CARE
Problem: Depression  Goal: Will be euthymic at discharge  Description: INTERVENTIONS:1. Administer medication as ordered2. Provide emotional support via 1:1 interaction with staff3. Encourage involvement in milieu/groups/activities4. Monitor for social isolation  Outcome: Progressing     Problem: Safety - Adult  Goal: Free from fall injury  Outcome: Progressing     Problem: ABCDS Injury Assessment  Goal: Absence of physical injury  4/17/2025 0743 by Samara Ramos, RN  Outcome: Progressing  4/16/2025 2342 by Sharita Meraz, RN  Outcome: Progressing  Flowsheets (Taken 4/16/2025 2342)  Absence of Physical Injury: Implement safety measures based on patient assessment  Note: Pt resting quietly in bed with eyes closed. No signs of respiratory distress. Safety measures in place. Will continue to monitor q15m rounds.

## 2025-04-17 NOTE — BH NOTE
GROUP THERAPY PROGRESS NOTE    Patient is participating in psychoeducation group.    Group time: 30 minutes    Personal goal for participation: To gain an understanding of emotions and emotional regulation.    Goal orientation: Personal    Group therapy participation: Active     Therapeutic interventions: Group members were educated about the Emotional Intelligence. Members also gained an understanding of what emotions do, how we can regulate them and myths about emotions. EQ assessment provided.    Impression of participation:  Pt was present and engaged in group discussion/activity. Pt added insight to group topic. Pt was calm, cooperative.       Katherine Gillies, MSW, [facility]

## 2025-04-17 NOTE — BH NOTE
GROUP THERAPY PROGRESS NOTE    Patient is participating in Expressions group.     Group time: 30 minutes    Personal goal for participation: To engage with SW and peers about likes/dislikes/needs on unit     Goal orientation: Personal    Group therapy participation: Active      Therapeutic interventions reviewed and discussed: Group allows members to interact with each other and SW to discuss concerns on unit     Impression of participation: Pt was present and engaged in group. Pt interacted with peers and .     Katherine Gillies MSW, [facility]-A       Show Applicator Variable?: Yes Duration Of Freeze Thaw-Cycle (Seconds): 3 Render Note In Bullet Format When Appropriate: No Detail Level: Detailed Number Of Freeze-Thaw Cycles: 1 freeze-thaw cycle Post-Care Instructions: I reviewed with the patient in detail post-care instructions. Patient is to wear sunprotection, and avoid picking at any of the treated lesions. Pt may apply Vaseline to crusted or scabbing areas. Consent: The patient's consent was obtained including but not limited to risks of crusting, scabbing, blistering, scarring, darker or lighter pigmentary change, recurrence, incomplete removal and infection.

## 2025-04-17 NOTE — INTERDISCIPLINARY ROUNDS
Behavioral Health Interdisciplinary Rounds     Patient Name: Justino Preciado  Age: 60 y.o.  Room/Bed:  [facility]/  Primary Diagnosis: Schizoaffective disorder (HCC)  Admission Status: Voluntary  Readmission within 30 days: No  Power of  in place: No  Patient requires a blocked bed: No          Reason for blocked bed: N/A  Sleep hours: 6+  Flu vaccine:   Participation in Care/Groups: Yes  Medication Compliant?: Yes  PRNS (last 24 hours): Hypertension and Cough  Restraints (last 24 hours): No  ____________________________  24 hour chart check complete: Yes    _______________________________________________    Patient goal(s) for today: meet with treatment team   Treatment team focus/goals: Plan to titrate his medications   Progress note: He did not sleep well last night -     Financial concerns/prescription coverage: no benefits   Family contact: no family contacts     Discharge plan: he is homeless   Access to weapons: no  Outpatient provider(s): refer to the [facility]     LOS:  3  Expected LOS: TBD     Participating treatment team members: Justino Preciado, Umm Tiwari,ZORAIDA_ Dr. Jamari Reyes, JanineD.

## 2025-04-17 NOTE — BH NOTE
GROUP THERAPY PROGRESS NOTE    Patient did not participate in self-care group.    Katherine Gillies MSW, [facility]-A

## 2025-04-17 NOTE — BH NOTE
GROUP THERAPY PROGRESS NOTE    Patient is participating in recreational therapy group.    Group time: 30 minutes    Personal goal for participation: Tree of Life    Goal orientation: Personal    Group therapy participation: passive     Therapeutic interventions reviewed and discussed:  Group members were given a handout with a blank tree. Members followed instructions about what the tree represents. Members were able to visually represent their roots, present, strengths and goals.     Impression of participation: Sw provided pts with directions and activity to complete independently     Katherine Gillies, MSW, [facility]

## 2025-04-17 NOTE — PLAN OF CARE
Problem: Depression  Goal: Will be euthymic at discharge  Description: INTERVENTIONS:1. Administer medication as ordered2. Provide emotional support via 1:1 interaction with staff3. Encourage involvement in milieu/groups/activities4. Monitor for social isolation  4/17/2025 1030 by Sheila Reina RN  Outcome: Progressing  Note: Out on unit this am attending groups,  passively engaged w peers. Describes elevated anxiety, poor sleep and change in appetite. Mood sad and frustrated. Does not share suicidal thoughts. Daily goal is to attend groups. Verbalized understanding of his plan of care. Staff focus is on offering support

## 2025-04-17 NOTE — BH NOTE
Pt stated that their anxiety is worse after taking scheduled minipress. Patient offered atarax for anxiety, patient refused.

## 2025-04-18 LAB
GLUCOSE BLD STRIP.AUTO-MCNC: 103 MG/DL (ref 65–117)
SERVICE CMNT-IMP: NORMAL

## 2025-04-18 PROCEDURE — 6370000000 HC RX 637 (ALT 250 FOR IP): Performed by: NURSE PRACTITIONER

## 2025-04-18 PROCEDURE — 82962 GLUCOSE BLOOD TEST: CPT

## 2025-04-18 PROCEDURE — 6370000000 HC RX 637 (ALT 250 FOR IP): Performed by: PSYCHIATRY & NEUROLOGY

## 2025-04-18 PROCEDURE — 1240000000 HC EMOTIONAL WELLNESS R&B

## 2025-04-18 RX ORDER — POLYMYXIN B SULFATE AND TRIMETHOPRIM 1; 10000 MG/ML; [USP'U]/ML
1 SOLUTION OPHTHALMIC EVERY 6 HOURS SCHEDULED
Status: DISPENSED | OUTPATIENT
Start: 2025-04-18 | End: 2025-04-25

## 2025-04-18 RX ORDER — PRAZOSIN HYDROCHLORIDE 1 MG/1
1 CAPSULE ORAL NIGHTLY
Status: DISCONTINUED | OUTPATIENT
Start: 2025-04-18 | End: 2025-04-19

## 2025-04-18 RX ORDER — LISINOPRIL 20 MG/1
40 TABLET ORAL DAILY
Status: DISCONTINUED | OUTPATIENT
Start: 2025-04-19 | End: 2025-04-25 | Stop reason: HOSPADM

## 2025-04-18 RX ORDER — FLUTICASONE PROPIONATE 50 MCG
1 SPRAY, SUSPENSION (ML) NASAL DAILY PRN
Status: DISCONTINUED | OUTPATIENT
Start: 2025-04-18 | End: 2025-04-25 | Stop reason: HOSPADM

## 2025-04-18 RX ADMIN — POLYMYXIN B SULFATE AND TRIMETHOPRIM 1 DROP: 10000; 1 SOLUTION OPHTHALMIC at 14:03

## 2025-04-18 RX ADMIN — CETIRIZINE HYDROCHLORIDE 10 MG: 10 TABLET, FILM COATED ORAL at 08:26

## 2025-04-18 RX ADMIN — HYDROXYZINE HYDROCHLORIDE 50 MG: 50 TABLET, FILM COATED ORAL at 08:29

## 2025-04-18 RX ADMIN — PRAZOSIN HYDROCHLORIDE 1 MG: 1 CAPSULE ORAL at 21:20

## 2025-04-18 RX ADMIN — AMLODIPINE BESYLATE 10 MG: 5 TABLET ORAL at 08:26

## 2025-04-18 RX ADMIN — POLYMYXIN B SULFATE AND TRIMETHOPRIM 1 DROP: 10000; 1 SOLUTION OPHTHALMIC at 17:41

## 2025-04-18 RX ADMIN — TRAZODONE HYDROCHLORIDE 150 MG: 100 TABLET ORAL at 21:20

## 2025-04-18 RX ADMIN — ESCITALOPRAM OXALATE 10 MG: 10 TABLET ORAL at 08:24

## 2025-04-18 RX ADMIN — ACETAMINOPHEN 650 MG: 325 TABLET ORAL at 21:19

## 2025-04-18 RX ADMIN — LISINOPRIL 20 MG: 20 TABLET ORAL at 08:24

## 2025-04-18 RX ADMIN — CLONIDINE HYDROCHLORIDE 0.1 MG: 0.1 TABLET ORAL at 09:49

## 2025-04-18 RX ADMIN — ATORVASTATIN CALCIUM 20 MG: 20 TABLET, FILM COATED ORAL at 08:27

## 2025-04-18 ASSESSMENT — PAIN DESCRIPTION - DESCRIPTORS: DESCRIPTORS: ACHING

## 2025-04-18 ASSESSMENT — PAIN DESCRIPTION - LOCATION: LOCATION: BACK

## 2025-04-18 ASSESSMENT — PAIN - FUNCTIONAL ASSESSMENT: PAIN_FUNCTIONAL_ASSESSMENT: ACTIVITIES ARE NOT PREVENTED

## 2025-04-18 ASSESSMENT — PAIN DESCRIPTION - ORIENTATION: ORIENTATION: LOWER

## 2025-04-18 ASSESSMENT — PAIN SCALES - GENERAL: PAINLEVEL_OUTOF10: 3

## 2025-04-18 NOTE — PLAN OF CARE
Problem: Depression  Goal: Will be euthymic at discharge  Description: INTERVENTIONS:1. Administer medication as ordered2. Provide emotional support via 1:1 interaction with staff3. Encourage involvement in milieu/groups/activities4. Monitor for social isolation  4/18/2025 0029 by Juma Loera, RN  Outcome: Progressing     Problem: Drug Abuse/Detox  Goal: Will have no detox symptoms and will verbalize plan for changing drug-related behavior  Description: INTERVENTIONS:1. Administer medication as ordered2. Monitor physical status3. Provide emotional support with 1:1 interaction with staff4. Encourage  recovery focused treatment   Outcome: Progressing   Patient resting in bed with eyes closed, appears to be sleeping, respirations even and unlabored.

## 2025-04-18 NOTE — INTERDISCIPLINARY ROUNDS
Behavioral Health Interdisciplinary Rounds     Patient Name: Justino Preciado  Age: 60 y.o.  Room/Bed:  [facility]  Primary Diagnosis: Schizoaffective disorder (HCC)   Admission Status:      Readmission within 30 days:   Power of  in place:   Patient requires a blocked bed: no          Reason for blocked bed:   Sleep hours:   Flu vaccine :no       Participation in Care/Groups:  yes  Medication Compliant?: yes  PRNS (last 24 hours):pain, anxiety    Restraints (last 24 hours):  no  __________________________________________________  OQ Admission Analysis Survey completed:  OQ Admission Analysis Survey score:    __________________________________________________     Alcohol screening (AUDIT) completed -     Score:   Tobacco :  Illegal Drugs use:   CSSR Lifetime:     24 hour chart check complete: yes     _______________________________________________    Patient goal(s) for today:   Treatment team focus/goals:   Progress note: Patient met with treatment team and appears with a flat mood and affect. Patient had concerns that Prazosin was impact his sleep and requested to decrease this medication. Plan to decrease Prazosin to 1mg. Patient reports that he has been experiencing nightmares about previous incidents in his life and that this typically results in anxiety and SI. He denies AH/VH.    LOS:  4  Expected LOS: TBD    Participating treatment team members: Justino Preciado, Claire Mckinley, MSW, Alecia Camacho, MSW Student, Samara BAEZ RN, Grisel Schmitz PharmD

## 2025-04-18 NOTE — BH NOTE
GROUP THERAPY PROGRESS NOTE    Patient is participating in self-care group.    Group time: 45 minutes    Personal goal for participation: to learn about the Four Agreements     Goal orientation: Personal    Group therapy participation: active     Therapeutic interventions reviewed and discussed: Members were able to watch a video summary of the book The Four Agreements. Members learned how the agreements can offer someone perspective and ability to unlearn toxic behaviors. Group members then were able to answer questions regarding their own experiences with the Four Agreements. Worksheet provided.    Impression of participation: Pt was present and engaged in group discussion/activity. Pt added insight  to topic. Pt was calm, cooperative.       Katherine Gillies MSW, QMHP-A

## 2025-04-18 NOTE — H&P
History and Physical    Date of Service:  4/18/2025  Primary Care Provider: No primary care provider on file.  Source of information: The patient and Chart review    Chief Complaint: Suicidal      History of Presenting Illness:   Justino Preciado is a 60 y.o. male who is admitted to the BHU unit at [facility].  PMH significant for HTN, HLD, psychiatric history of recurrent severe MDD, cocaine use disorder.  Patient admitted to U for worsening suicidal thoughts and plans to end his life.  Hospitalist service consulted for right eye swelling, redness and pain.     No restricted eye movement on exam, some mild discomfort, + scleral injection to both eyes, right eye redder than left. Per hx started in left eye and now to right eye. Reports watery discharge, gritty sensation in eyes, worse in right eye. Woke up 2 days in a row with eyelids crusted. + sinus congestion      REVIEW OF SYSTEMS:  A comprehensive review of systems was negative except for that written in the History of Present Illness.     Past Medical History:   Diagnosis Date    Hypertension       No past surgical history on file.  Prior to Admission medications    Medication Sig Start Date End Date Taking? Authorizing Provider   atorvastatin (LIPITOR) 20 MG tablet Take 1 tablet by mouth daily   Yes Art Arnold MD   lisinopril (PRINIVIL;ZESTRIL) 10 MG tablet Take 1 tablet by mouth daily   Yes Art Arnold MD   amLODIPine (NORVASC) 10 MG tablet Take 1 tablet by mouth daily   Yes Art Arnold MD   hydrOXYzine HCl (ATARAX) 50 MG tablet Take 1 tablet by mouth 3 times daily as needed for Anxiety  Patient not taking: Reported on 4/15/2025    ProviderArt MD     No Known Allergies   No family history on file.   Social History:  reports that he has been smoking cigarettes. He has never used smokeless tobacco. He reports current alcohol use. He reports that he does not currently use drugs.   Social Drivers of Health     Tobacco

## 2025-04-18 NOTE — INTERDISCIPLINARY ROUNDS
Behavioral Health Interdisciplinary Rounds     Patient Name: Justino Preciado  Age: 60 y.o.  Room/Bed:  [facility]  Primary Diagnosis: Schizoaffective disorder (HCC)   Admission Status:      Readmission within 30 days:   Power of  in place:   Patient requires a blocked bed: no          Reason for blocked bed:   Sleep hours:   Flu vaccine :no       Participation in Care/Groups:  yes  Medication Compliant?: yes  PRNS (last 24 hours):pain, anxiety    Restraints (last 24 hours):  no  __________________________________________________  OQ Admission Analysis Survey completed:  OQ Admission Analysis Survey score:    __________________________________________________     Alcohol screening (AUDIT) completed -     Score:   Tobacco :  Illegal Drugs use:   CSSR Lifetime:     24 hour chart check complete: yes     _______________________________________________    Patient goal(s) for today:   Treatment team focus/goals:   Progress note:      Spiritual Care Consult:   Financial concerns/prescription coverage:    Family contact:                        Family requesting physician contact today:    Discharge plan:   Access to weapons :                                                              Outpatient provider(s):     LOS:  4  Expected LOS:     Participating treatment team members: Justino Preciado, * (assigned SW),

## 2025-04-18 NOTE — PROGRESS NOTES
PSYCHIATRIC PROGRESS NOTE    Chief Complaint: 'i'm not getting better.'    Length of Stay: 4 Days    Interval History:  4/18/25 - Mr. Preciado is unchanged. He remains isolative to his room and is minimally interactive. BP remains elevated and we will increase his dosage of Lisinopril. Says he had bad dreams last night which were upsetting to him and feels it spoils his entire day. Mood is \"down\" and thoughts of suicide are still present. He ascribes some of his symptoms to Prazosin and wants to dosage reduced. Denies any Ah or Vh today. Nursing staff note that he seems to be depressed and does not interact much.     04/17/2025  Nursing report patient had poor sleep last night.  Justino says that he is attending in groups to benefit his mental health. He is feeling tired today and describe struggling with ruminations about his past. He feels hopeless and helpless, but wants to do his part. He was wondering if medication changes have to do with his worsening sleep.  He says trazodone last night did help him fall asleep, but wasn't able to stay asleep long.    04/16/2025  Patient says that his sleep was poor. He wasn't able to sleep until quite late in the morning.  He tolerated starting lexapro 5mg po yesterday. He also benefited from starting prazosin. Justino experiences recurrent thoughts of suicide, but feels they are decreasing.  No psychotic symptoms.      Past Medical History:  Past Medical History:   Diagnosis Date    Hypertension           trimethoprim-polymyxin b  1 drop Both Eyes 4 times per day    [START ON 4/19/2025] lisinopril  40 mg Oral Daily    cetirizine  10 mg Oral Daily    traZODone  100 mg Oral Nightly    escitalopram  10 mg Oral Daily    prazosin  2 mg Oral Nightly    amLODIPine  10 mg Oral Daily    atorvastatin  20 mg Oral Daily       Labs:  Lab Results   Component Value Date/Time    WBC 5.4 04/13/2025 06:22 PM    HGB 14.3 04/13/2025 06:22 PM    HCT 42.4 04/13/2025 06:22 PM     04/13/2025  notes, labs and vitals.    I certify that this patients inpatient psychiatric hospital services furnished since the previous certification were, and continue to be, required for treatment that could reasonably be expected to improve the patient's condition, or for diagnostic study, and that the patient continues to need, on a daily basis, active treatment furnished directly by or requiring the supervision of inpatient psychiatric facility personnel. In addition, the hospital records show that services furnished were intensive treatment services, admission or related services, or equivalent services.

## 2025-04-18 NOTE — BH NOTE
GROUP THERAPY PROGRESS NOTE    Patient is participating in psychoeducation group.    Group time: 30 minutes    Personal goal for participation: To develop an understanding of Habit loops and strategies to change our unhealthy behaviors.     Goal orientation: Personal    Group therapy participation: Active     Therapeutic interventions reviewed and discussed:  Group members were introduced to habit loops and addressing bad habits. Pts watched video about strategies for building healthy habits. Pts were able to gain knowledge of personal habits and engage in discussion with peers.     Impression of participation:  Pt was present and engaged in discussion. Pt added insight to topic. Pt was calm, cooperative     Katherine Gillies, MSW, HP-A

## 2025-04-18 NOTE — PLAN OF CARE
Pt states he is \"not doing too good\", says he continues to have bad dreams/nightmare, feels anxious often interrupted sleep because of bad thoughts at night making him feel more depressed Med/Meal compliant. Passive S/I.  Denies H/I, A/H and V/H,    Problem: Depression  Goal: Will be euthymic at discharge  Description: INTERVENTIONS:1. Administer medication as ordered2. Provide emotional support via 1:1 interaction with staff3. Encourage involvement in milieu/groups/activities4. Monitor for social isolation  4/18/2025 1104 by Samara Ramos RN  Outcome: Not Progressing  4/18/2025 0029 by Juma Loera RN  Outcome: Progressing     Problem: Drug Abuse/Detox  Goal: Will have no detox symptoms and will verbalize plan for changing drug-related behavior  Description: INTERVENTIONS:1. Administer medication as ordered2. Monitor physical status3. Provide emotional support with 1:1 interaction with staff4. Encourage  recovery focused treatment   4/18/2025 1104 by Samara Ramos RN  Outcome: Progressing  4/18/2025 0029 by Juma Loera RN  Outcome: Progressing     Problem: Safety - Adult  Goal: Free from fall injury  Outcome: Progressing     Problem: Depression  Goal: Will be euthymic at discharge  Description: INTERVENTIONS:1. Administer medication as ordered2. Provide emotional support via 1:1 interaction with staff3. Encourage involvement in milieu/groups/activities4. Monitor for social isolation  4/18/2025 1104 by Samara Ramos RN  Outcome: Not Progressing  4/18/2025 0029 by Juma Loera RN  Outcome: Progressing

## 2025-04-18 NOTE — BH NOTE
GROUP THERAPY PROGRESS NOTE    Patient is participating in Recreation therapy group.    Group time: 30 minutes    Personal goal for participation: To engage in activities designed to promote brain health.     Goal orientation: Personal    Group therapy participation: passive      Therapeutic interventions reviewed and discussed:  Group members were given the opportunity to engage in activities designed to stimulate the brain. Members were able to use critical thinking skills as well as socialization skills.  Members completed various puzzles/fill in the blanks/trivia together.     Impression of participation: SW provided pt with activity to complete independently .        Katherine Gillies, MSW, HP-A

## 2025-04-19 PROCEDURE — 6370000000 HC RX 637 (ALT 250 FOR IP): Performed by: NURSE PRACTITIONER

## 2025-04-19 PROCEDURE — 6370000000 HC RX 637 (ALT 250 FOR IP): Performed by: PSYCHIATRY & NEUROLOGY

## 2025-04-19 PROCEDURE — 1240000000 HC EMOTIONAL WELLNESS R&B

## 2025-04-19 RX ORDER — PRAZOSIN HYDROCHLORIDE 1 MG/1
2 CAPSULE ORAL NIGHTLY
Status: DISCONTINUED | OUTPATIENT
Start: 2025-04-19 | End: 2025-04-20

## 2025-04-19 RX ADMIN — TRAZODONE HYDROCHLORIDE 150 MG: 100 TABLET ORAL at 21:14

## 2025-04-19 RX ADMIN — HYDROXYZINE HYDROCHLORIDE 50 MG: 50 TABLET, FILM COATED ORAL at 21:16

## 2025-04-19 RX ADMIN — POLYMYXIN B SULFATE AND TRIMETHOPRIM 1 DROP: 10000; 1 SOLUTION OPHTHALMIC at 12:31

## 2025-04-19 RX ADMIN — HYDROXYZINE HYDROCHLORIDE 50 MG: 50 TABLET, FILM COATED ORAL at 09:09

## 2025-04-19 RX ADMIN — AMLODIPINE BESYLATE 10 MG: 5 TABLET ORAL at 09:04

## 2025-04-19 RX ADMIN — CETIRIZINE HYDROCHLORIDE 10 MG: 10 TABLET, FILM COATED ORAL at 09:03

## 2025-04-19 RX ADMIN — ACETAMINOPHEN 650 MG: 325 TABLET ORAL at 21:17

## 2025-04-19 RX ADMIN — POLYMYXIN B SULFATE AND TRIMETHOPRIM 1 DROP: 10000; 1 SOLUTION OPHTHALMIC at 06:36

## 2025-04-19 RX ADMIN — HYDROXYZINE HYDROCHLORIDE 50 MG: 50 TABLET, FILM COATED ORAL at 00:24

## 2025-04-19 RX ADMIN — ACETAMINOPHEN 650 MG: 325 TABLET ORAL at 09:03

## 2025-04-19 RX ADMIN — ATORVASTATIN CALCIUM 20 MG: 20 TABLET, FILM COATED ORAL at 09:03

## 2025-04-19 RX ADMIN — POLYMYXIN B SULFATE AND TRIMETHOPRIM 1 DROP: 10000; 1 SOLUTION OPHTHALMIC at 00:17

## 2025-04-19 RX ADMIN — PRAZOSIN HYDROCHLORIDE 2 MG: 1 CAPSULE ORAL at 21:14

## 2025-04-19 RX ADMIN — LISINOPRIL 40 MG: 20 TABLET ORAL at 09:03

## 2025-04-19 RX ADMIN — POLYMYXIN B SULFATE AND TRIMETHOPRIM 1 DROP: 10000; 1 SOLUTION OPHTHALMIC at 18:00

## 2025-04-19 RX ADMIN — ESCITALOPRAM OXALATE 10 MG: 10 TABLET ORAL at 09:04

## 2025-04-19 RX ADMIN — CLONIDINE HYDROCHLORIDE 0.1 MG: 0.1 TABLET ORAL at 17:38

## 2025-04-19 RX ADMIN — FLUTICASONE PROPIONATE 1 SPRAY: 50 SPRAY, METERED NASAL at 12:31

## 2025-04-19 ASSESSMENT — PAIN SCALES - GENERAL: PAINLEVEL_OUTOF10: 2

## 2025-04-19 ASSESSMENT — PAIN DESCRIPTION - DESCRIPTORS: DESCRIPTORS: ACHING

## 2025-04-19 ASSESSMENT — PAIN SCALES - WONG BAKER: WONGBAKER_NUMERICALRESPONSE: HURTS A LITTLE BIT

## 2025-04-19 ASSESSMENT — PAIN DESCRIPTION - LOCATION: LOCATION: BACK

## 2025-04-19 NOTE — BH NOTE
PRN Medication Documentation    Specific patient behavior that led to need for PRN medication: Elevated BP (189/95)  Staff interventions attempted prior to PRN being given: Promote rest  PRN medication given: Clonidine  Patient response/effectiveness of PRN medication: NAIMA aware

## 2025-04-19 NOTE — PROGRESS NOTES
[facility] Adult  Hospitalist Group                                                                                          Hospitalist Progress Note  PRASHANT Panda - NP  Office Phone: [phone]        Date of Service:  2025  NAME:  Justino Preciado  :  1965  MRN:  [id number]       Admission Summary:   Justino Preciado is a 60 y.o. male who is admitted to the BHU unit at [facility].  PMH significant for HTN, HLD, psychiatric history of recurrent severe MDD, cocaine use disorder.  Patient admitted to U for worsening suicidal thoughts and plans to end his life.  Hospitalist service consulted for right eye swelling, redness and pain.      No restricted eye movement on exam, some mild discomfort, + scleral injection to both eyes, right eye redder than left. Per hx started in left eye and now to right eye. Reports watery discharge, gritty sensation in eyes, worse in right eye. Woke up 2 days in a row with eyelids crusted. + sinus congestion        Interval history / Subjective:   Woke up this morning with bilateral crust on eyes. Discussed with patient to continue w/ eye drops.  Usually starts working in 1-3 days but can take up to 5-7 days. Pt does note a slight improvement.  Will sign off and be available as needed.      Assessment & Plan:     MDD, Recurrent, severe, without psychosis   - Per primary team     Bilateral bacterial conjunctivitis  Sinus congestion  - polytrim drops q 6 hours x 7 days  - warm compresses to remove crusting  - clean discharge with warm water  - avoid touching/rubbing eyes  - good hand hygiene  - flonase prn for nasal congestion     HTN  - c/w amlodipine, Lasix  - trend BPs     HLD  - c/w atorvastatin       signing off         Social Drivers of Health     Tobacco Use: High Risk (10/8/2024)    Patient History     Smoking Tobacco Use: Every Day     Smokeless Tobacco Use: Never     Passive Exposure: Not on file   Alcohol Use: Unknown (2025)    AUDIT-C

## 2025-04-19 NOTE — INTERDISCIPLINARY ROUNDS
Behavioral Health Interdisciplinary Rounds     Patient Name: Justino Preciado  Age: 60 y.o.  Room/Bed:  [facility]  Primary Diagnosis: Schizoaffective disorder (HCC)   Admission Status: Voluntary     Readmission within 30 days:   Power of  in place:   Patient requires a blocked bed: No          Reason for blocked bed: N/A  Sleep hours:   Flu vaccine : not received this season      Participation in Care/Groups:  Yes  Medication Compliant?:  Medication Compliant: Yes  PRNS (last 24 hours):  PRNS: Antianxiety and Pain    Restraints (last 24 hours):  NO  __________________________________________________  OQ Admission Analysis Survey completed:  OQ Admission Analysis Survey score:    __________________________________________________     Alcohol screening (AUDIT) completed -     Score:   Tobacco :  Illegal Drugs use:   CSSR Lifetime:     24 hour chart check complete: Yes    _______________________________________________    Patient goal(s) for today:   Treatment team focus/goals:   Progress note:      Spiritual Care Consult:   Financial concerns/prescription coverage:    Family contact:                        Family requesting physician contact today:    Discharge plan:   Access to weapons :                                                              Outpatient provider(s):     LOS:  5  Expected LOS:     Participating treatment team members: Justino Preciado, * (assigned SW),

## 2025-04-19 NOTE — PLAN OF CARE
Pt is resting in bed with eyes closed, appears to be sleeping. Respirations are even and unlabored, NAD. Safety measures are in place and Q15 minute rounds are done.   Problem: ABCDS Injury Assessment  Goal: Absence of physical injury  Outcome: Progressing

## 2025-04-19 NOTE — BH NOTE
PRN Medication Documentation    Specific patient behavior that led to need for PRN medication: Anxiety  Staff interventions attempted prior to PRN being given: Therapeutic talk, education on mindfulness exercises  PRN medication given: Atarax  Patient response/effectiveness of PRN medication: TL aware

## 2025-04-19 NOTE — PROGRESS NOTES
PSYCHIATRIC PROGRESS NOTE    Chief Complaint: 'i'm not getting better.'    Length of Stay: 5 Days    Interval History:  4/19/25 - (Weekend coverage)Mr. Preciado reports good sleep until about 1:30 when he woke up to use the bathroom and couldn't fall back asleep. States he is still having nightmares. Reports he is \"pretty much scared to go back to sleep\" due to nightmares. Denies active SI or HI. Compliant with medications.    4/18/25 - Mr. Preciado is unchanged. He remains isolative to his room and is minimally interactive. BP remains elevated and we will increase his dosage of Lisinopril. Says he had bad dreams last night which were upsetting to him and feels it spoils his entire day. Mood is \"down\" and thoughts of suicide are still present. He ascribes some of his symptoms to Prazosin and wants to dosage reduced. Denies any Ah or Vh today. Nursing staff note that he seems to be depressed and does not interact much.     04/17/2025  Nursing report patient had poor sleep last night.  Justino says that he is attending in groups to benefit his mental health. He is feeling tired today and describe struggling with ruminations about his past. He feels hopeless and helpless, but wants to do his part. He was wondering if medication changes have to do with his worsening sleep.  He says trazodone last night did help him fall asleep, but wasn't able to stay asleep long.    04/16/2025  Patient says that his sleep was poor. He wasn't able to sleep until quite late in the morning.  He tolerated starting lexapro 5mg po yesterday. He also benefited from starting prazosin. Justino experiences recurrent thoughts of suicide, but feels they are decreasing.  No psychotic symptoms.      Past Medical History:  Past Medical History:   Diagnosis Date    Hypertension           trimethoprim-polymyxin b  1 drop Both Eyes 4 times per day    lisinopril  40 mg Oral Daily    prazosin  1 mg Oral Nightly    traZODone  150 mg Oral Nightly    cetirizine  10  multidisplinary treatment team round was conducted with the patient, nurses, pharmcist,  and writer present. Discussions held with , and/or with family members; Complete current electronic health record for patient was reviewed in full including consultant notes, ancillary staff notes, nurses and tech notes, labs and vitals.    I certify that this patients inpatient psychiatric hospital services furnished since the previous certification were, and continue to be, required for treatment that could reasonably be expected to improve the patient's condition, or for diagnostic study, and that the patient continues to need, on a daily basis, active treatment furnished directly by or requiring the supervision of inpatient psychiatric facility personnel. In addition, the hospital records show that services furnished were intensive treatment services, admission or related services, or equivalent services.

## 2025-04-19 NOTE — PLAN OF CARE
Problem: Depression  Goal: Will be euthymic at discharge  Description: INTERVENTIONS:1. Administer medication as ordered2. Provide emotional support via 1:1 interaction with staff3. Encourage involvement in milieu/groups/activities4. Monitor for social isolation  Outcome: Progressing  Note: Pt participated in treatment team. Out on the unit for small periods of time. Continues to have problems sleeping. Continues to have nightmares per pt. Encouraged pt to verbalize feelings and concerns.

## 2025-04-20 PROCEDURE — 6370000000 HC RX 637 (ALT 250 FOR IP): Performed by: PSYCHIATRY & NEUROLOGY

## 2025-04-20 PROCEDURE — 6370000000 HC RX 637 (ALT 250 FOR IP): Performed by: NURSE PRACTITIONER

## 2025-04-20 PROCEDURE — 6370000000 HC RX 637 (ALT 250 FOR IP)

## 2025-04-20 PROCEDURE — 1240000000 HC EMOTIONAL WELLNESS R&B

## 2025-04-20 RX ORDER — PRAZOSIN HYDROCHLORIDE 1 MG/1
3 CAPSULE ORAL NIGHTLY
Status: DISCONTINUED | OUTPATIENT
Start: 2025-04-20 | End: 2025-04-25 | Stop reason: HOSPADM

## 2025-04-20 RX ADMIN — TRAZODONE HYDROCHLORIDE 150 MG: 100 TABLET ORAL at 20:12

## 2025-04-20 RX ADMIN — HYDROXYZINE HYDROCHLORIDE 50 MG: 50 TABLET, FILM COATED ORAL at 20:12

## 2025-04-20 RX ADMIN — GUAIFENESIN AND DEXTROMETHORPHAN 5 ML: 100; 10 SYRUP ORAL at 00:08

## 2025-04-20 RX ADMIN — POLYMYXIN B SULFATE AND TRIMETHOPRIM 1 DROP: 10000; 1 SOLUTION OPHTHALMIC at 18:00

## 2025-04-20 RX ADMIN — POLYMYXIN B SULFATE AND TRIMETHOPRIM 1 DROP: 10000; 1 SOLUTION OPHTHALMIC at 12:51

## 2025-04-20 RX ADMIN — CETIRIZINE HYDROCHLORIDE 10 MG: 10 TABLET, FILM COATED ORAL at 08:43

## 2025-04-20 RX ADMIN — LISINOPRIL 40 MG: 20 TABLET ORAL at 08:42

## 2025-04-20 RX ADMIN — HALOPERIDOL 5 MG: 5 TABLET ORAL at 20:12

## 2025-04-20 RX ADMIN — ESCITALOPRAM OXALATE 10 MG: 10 TABLET ORAL at 08:43

## 2025-04-20 RX ADMIN — POLYMYXIN B SULFATE AND TRIMETHOPRIM 1 DROP: 10000; 1 SOLUTION OPHTHALMIC at 00:08

## 2025-04-20 RX ADMIN — ATORVASTATIN CALCIUM 20 MG: 20 TABLET, FILM COATED ORAL at 08:42

## 2025-04-20 RX ADMIN — PRAZOSIN HYDROCHLORIDE 3 MG: 1 CAPSULE ORAL at 20:12

## 2025-04-20 RX ADMIN — AMLODIPINE BESYLATE 10 MG: 5 TABLET ORAL at 08:42

## 2025-04-20 RX ADMIN — POLYMYXIN B SULFATE AND TRIMETHOPRIM 1 DROP: 10000; 1 SOLUTION OPHTHALMIC at 06:26

## 2025-04-20 NOTE — PLAN OF CARE
Problem: Depression  Goal: Will be euthymic at discharge  Description: INTERVENTIONS:1. Administer medication as ordered2. Provide emotional support via 1:1 interaction with staff3. Encourage involvement in milieu/groups/activities4. Monitor for social isolation  4/20/2025 1704 by Geeta Lane, RN  Outcome: Progressing       Problem: Drug Abuse/Detox  Goal: Will have no detox symptoms and will verbalize plan for changing drug-related behavior  Description: INTERVENTIONS:1. Administer medication as ordered2. Monitor physical status3. Provide emotional support with 1:1 interaction with staff4. Encourage  recovery focused treatment   Outcome: Progressing

## 2025-04-20 NOTE — INTERDISCIPLINARY ROUNDS
Behavioral Health Interdisciplinary Rounds     Patient Name: Justino Preciado  Age: 60 y.o.  Room/Bed:  [facility]  Primary Diagnosis: Schizoaffective disorder (HCC)   Admission Status: Voluntary     Readmission within 30 days:   Power of  in place:   Patient requires a blocked bed: No          Reason for blocked bed:   Sleep hours: 7+  Flu vaccine : not received this season      Participation in Care/Groups:  Yes  Medication Compliant?:  Medication Compliant: Yes  PRNS (last 24 hours):  PRNS: Antianxiety, Pain, and blood pressure, cough, nasal symptoms     Restraints (last 24 hours):  NO  __________________________________________________  OQ Admission Analysis Survey completed:  OQ Admission Analysis Survey score:    __________________________________________________     Alcohol screening (AUDIT) completed -     Score:   Tobacco :  Illegal Drugs use:   CSSR Lifetime:     24 hour chart check complete: Yes    _______________________________________________    Patient goal(s) for today:   Treatment team focus/goals:   Progress note:      Spiritual Care Consult:   Financial concerns/prescription coverage:    Family contact:                        Family requesting physician contact today:    Discharge plan:   Access to weapons :                                                              Outpatient provider(s):     LOS:  6  Expected LOS:     Participating treatment team members: Justino Preciado, * (assigned SW),

## 2025-04-20 NOTE — PROGRESS NOTES
PSYCHIATRIC PROGRESS NOTE    Chief Complaint: 'i'm not getting better.'    Length of Stay: 6 Days    Interval History:  4/20/25 (Weekend coverage) Mr. Preciado reports better sleep last night and nightmares were \"better, but still there.\" Denies active SI or HI. Reports anxiety is lower. Open to the idea of increasing prazosin for nightmares as having no side effects.      4/19/25 - (Weekend coverage)Mr. Preciado reports good sleep until about 1:30 when he woke up to use the bathroom and couldn't fall back asleep. States he is still having nightmares. Reports he is \"pretty much scared to go back to sleep\" due to nightmares. Denies active SI or HI. Compliant with medications.    4/18/25 - Mr. Preciado is unchanged. He remains isolative to his room and is minimally interactive. BP remains elevated and we will increase his dosage of Lisinopril. Says he had bad dreams last night which were upsetting to him and feels it spoils his entire day. Mood is \"down\" and thoughts of suicide are still present. He ascribes some of his symptoms to Prazosin and wants to dosage reduced. Denies any Ah or Vh today. Nursing staff note that he seems to be depressed and does not interact much.     04/17/2025  Nursing report patient had poor sleep last night.  Justino says that he is attending in groups to benefit his mental health. He is feeling tired today and describe struggling with ruminations about his past. He feels hopeless and helpless, but wants to do his part. He was wondering if medication changes have to do with his worsening sleep.  He says trazodone last night did help him fall asleep, but wasn't able to stay asleep long.    04/16/2025  Patient says that his sleep was poor. He wasn't able to sleep until quite late in the morning.  He tolerated starting lexapro 5mg po yesterday. He also benefited from starting prazosin. Justino experiences recurrent thoughts of suicide, but feels they are decreasing.  No psychotic symptoms.      Past

## 2025-04-20 NOTE — PLAN OF CARE
Problem: Depression  Goal: Will be euthymic at discharge  Description: INTERVENTIONS:1. Administer medication as ordered2. Provide emotional support via 1:1 interaction with staff3. Encourage involvement in milieu/groups/activities4. Monitor for social isolation  Outcome: Progressing  Note: Pt participated in treatment team. Out on the unit for small periods of time. Stated he is waking up in the middle of the night. Discussed medications during treatment team.  Mood has improved and less anxious.

## 2025-04-20 NOTE — PLAN OF CARE
Patient resting in bed with eyes closed. No needs voiced to staff at this time, no respiratory distress noted. Patient in NAD, and monitored with 15 minute safety rounds.    Problem: Safety - Adult  Goal: Free from fall injury  Outcome: Progressing     Problem: Pain  Goal: Verbalizes/displays adequate comfort level or baseline comfort level  Outcome: Progressing

## 2025-04-21 PROCEDURE — 6370000000 HC RX 637 (ALT 250 FOR IP): Performed by: PSYCHIATRY & NEUROLOGY

## 2025-04-21 PROCEDURE — 6370000000 HC RX 637 (ALT 250 FOR IP): Performed by: NURSE PRACTITIONER

## 2025-04-21 PROCEDURE — 1240000000 HC EMOTIONAL WELLNESS R&B

## 2025-04-21 RX ORDER — HYDROCHLOROTHIAZIDE 25 MG/1
25 TABLET ORAL DAILY
Status: DISCONTINUED | OUTPATIENT
Start: 2025-04-21 | End: 2025-04-23

## 2025-04-21 RX ORDER — ESCITALOPRAM OXALATE 10 MG/1
20 TABLET ORAL DAILY
Status: DISCONTINUED | OUTPATIENT
Start: 2025-04-22 | End: 2025-04-25 | Stop reason: HOSPADM

## 2025-04-21 RX ORDER — ESCITALOPRAM OXALATE 10 MG/1
10 TABLET ORAL ONCE
Status: COMPLETED | OUTPATIENT
Start: 2025-04-21 | End: 2025-04-21

## 2025-04-21 RX ADMIN — POLYMYXIN B SULFATE AND TRIMETHOPRIM 1 DROP: 10000; 1 SOLUTION OPHTHALMIC at 18:22

## 2025-04-21 RX ADMIN — ESCITALOPRAM OXALATE 10 MG: 10 TABLET ORAL at 12:20

## 2025-04-21 RX ADMIN — HYDROCHLOROTHIAZIDE 25 MG: 25 TABLET ORAL at 12:20

## 2025-04-21 RX ADMIN — PRAZOSIN HYDROCHLORIDE 3 MG: 1 CAPSULE ORAL at 20:49

## 2025-04-21 RX ADMIN — ACETAMINOPHEN 650 MG: 325 TABLET ORAL at 12:22

## 2025-04-21 RX ADMIN — AMLODIPINE BESYLATE 10 MG: 5 TABLET ORAL at 09:01

## 2025-04-21 RX ADMIN — ATORVASTATIN CALCIUM 20 MG: 20 TABLET, FILM COATED ORAL at 09:01

## 2025-04-21 RX ADMIN — LISINOPRIL 40 MG: 20 TABLET ORAL at 09:00

## 2025-04-21 RX ADMIN — CLONIDINE HYDROCHLORIDE 0.1 MG: 0.1 TABLET ORAL at 20:49

## 2025-04-21 RX ADMIN — POLYMYXIN B SULFATE AND TRIMETHOPRIM 1 DROP: 10000; 1 SOLUTION OPHTHALMIC at 06:56

## 2025-04-21 RX ADMIN — ESCITALOPRAM OXALATE 10 MG: 10 TABLET ORAL at 09:01

## 2025-04-21 RX ADMIN — TRAZODONE HYDROCHLORIDE 150 MG: 100 TABLET ORAL at 20:49

## 2025-04-21 RX ADMIN — CETIRIZINE HYDROCHLORIDE 10 MG: 10 TABLET, FILM COATED ORAL at 09:01

## 2025-04-21 RX ADMIN — POLYMYXIN B SULFATE AND TRIMETHOPRIM 1 DROP: 10000; 1 SOLUTION OPHTHALMIC at 12:20

## 2025-04-21 RX ADMIN — ACETAMINOPHEN 650 MG: 325 TABLET ORAL at 20:49

## 2025-04-21 ASSESSMENT — PAIN SCALES - WONG BAKER: WONGBAKER_NUMERICALRESPONSE: HURTS A LITTLE BIT

## 2025-04-21 ASSESSMENT — PAIN SCALES - GENERAL
PAINLEVEL_OUTOF10: 2
PAINLEVEL_OUTOF10: 3

## 2025-04-21 ASSESSMENT — PAIN DESCRIPTION - ORIENTATION: ORIENTATION: LOWER

## 2025-04-21 ASSESSMENT — PAIN DESCRIPTION - LOCATION: LOCATION: BACK

## 2025-04-21 ASSESSMENT — PAIN DESCRIPTION - DESCRIPTORS: DESCRIPTORS: ACHING

## 2025-04-21 NOTE — INTERDISCIPLINARY ROUNDS
Behavioral Health Interdisciplinary Rounds     Patient Name: Justino Preciado  Age: 60 y.o.  Room/Bed:  8/  Primary Diagnosis: Schizoaffective disorder (HCC)   Admission Status:      Readmission within 30 days:   Power of  in place:   Patient requires a blocked bed: No          Reason for blocked bed: n/a  Sleep hours: 7+ hours  Flu vaccine : No       Participation in Care/Groups:  n/a  Medication Compliant?: Yes  PRNS (last 24 hours): PO antianxiety and PO antipsychotic    Restraints (last 24 hours):  no  _____________________________  24 hour chart check complete: yes     _______________________________________________    Patient goal(s) for today: meet with treatment team   Treatment team focus/goals: Plan to set up for discharge   Progress note: He reports he continues to not sleep well, BP continues to be high      Spiritual Care Consult: no  Financial concerns/prescription coverage:  no benefits at this time   Family contact: no family contacts                      Family requesting physician contact today:    Discharge plan: he is homeless   Access to weapons : no                                                             Outpatient provider(s): refer to homeless services [facility] - and the [facility] -    LOS:  7  Expected LOS: TBD     Participating treatment team members: Justino PreciadoUmm SW- Dr. Jamari Reyes, PharmD. - Sheila Reina RN

## 2025-04-21 NOTE — PROGRESS NOTES
PSYCHIATRIC PROGRESS NOTE    Chief Complaint: 'i'm not getting better.'    Length of Stay: 7 Days    Interval History:  04/21/2025  Mr. Preciado shares that his anxiety is significant and bothersome. He feels restless but is hopeful. No AH.  He feels that his dreams are more vivid, but no rhonda nightmares.  He continues to take his medications and reports no side-effects.    4/20/25 (Weekend coverage) Mr. Preciado reports better sleep last night and nightmares were \"better, but still there.\" Denies active SI or HI. Reports anxiety is lower. Open to the idea of increasing prazosin for nightmares as having no side effects.    4/19/25 - (Weekend coverage)Mr. Preciado reports good sleep until about 1:30 when he woke up to use the bathroom and couldn't fall back asleep. States he is still having nightmares. Reports he is \"pretty much scared to go back to sleep\" due to nightmares. Denies active SI or HI. Compliant with medications.    4/18/25 - Mr. Preciado is unchanged. He remains isolative to his room and is minimally interactive. BP remains elevated and we will increase his dosage of Lisinopril. Says he had bad dreams last night which were upsetting to him and feels it spoils his entire day. Mood is \"down\" and thoughts of suicide are still present. He ascribes some of his symptoms to Prazosin and wants to dosage reduced. Denies any Ah or Vh today. Nursing staff note that he seems to be depressed and does not interact much.     04/17/2025  Nursing report patient had poor sleep last night.  Justino says that he is attending in groups to benefit his mental health. He is feeling tired today and describe struggling with ruminations about his past. He feels hopeless and helpless, but wants to do his part. He was wondering if medication changes have to do with his worsening sleep.  He says trazodone last night did help him fall asleep, but wasn't able to stay asleep long.    04/16/2025  Patient says that his sleep was poor. He wasn't able

## 2025-04-21 NOTE — PLAN OF CARE
Problem: Depression  Goal: Will be euthymic at discharge  Description: INTERVENTIONS:1. Administer medication as ordered2. Provide emotional support via 1:1 interaction with staff3. Encourage involvement in milieu/groups/activities4. Monitor for social isolation  Outcome: Progressing  Note: Out on unit passively engaged, appearing flat to sad. Reports feeling increase in anxiety that is uncomfortable. Reports broken sleep continues. Denies SI, no plan, no intent no self harm. Daily goal is to talk with tx team regarding his medications, poor sleep and anxiety. Staff focus is on offering support

## 2025-04-21 NOTE — PLAN OF CARE
Problem: ABCDS Injury Assessment  Goal: Absence of physical injury  Outcome: Progressing     Pt appears to be resting in bed in no apparent distress, respirations even and unlabored. No voiced concerns. Standard fall precautions maintained. Q15m rounds for safety continued per provider order.

## 2025-04-21 NOTE — PROGRESS NOTES
Behavioral Services                                              Medicare Re-Certification    I certify that the inpatient psychiatric hospital services furnished since the previous certification/re-certification were, and continue to be, medically necessary for;    [x] (1) Treatment which could reasonably be expected to improve the patient's condition,    [] (2) Or for diagnostic study.    Estimated length of stay/service 3-5 Days.    Plan for post-hospital care outpatient psychiatry.    This patient continues to need, on a daily basis, active treatment furnished directly by or requiring the supervision of inpatient psychiatric personnel.    Electronically signed by Huber Kauffman MD on 4/21/2025 at 11:33 AM

## 2025-04-22 PROCEDURE — 6370000000 HC RX 637 (ALT 250 FOR IP): Performed by: NURSE PRACTITIONER

## 2025-04-22 PROCEDURE — 6370000000 HC RX 637 (ALT 250 FOR IP): Performed by: PSYCHIATRY & NEUROLOGY

## 2025-04-22 PROCEDURE — 1240000000 HC EMOTIONAL WELLNESS R&B

## 2025-04-22 RX ORDER — TRAZODONE HYDROCHLORIDE 100 MG/1
200 TABLET ORAL NIGHTLY
Status: DISCONTINUED | OUTPATIENT
Start: 2025-04-22 | End: 2025-04-25 | Stop reason: HOSPADM

## 2025-04-22 RX ADMIN — AMLODIPINE BESYLATE 10 MG: 5 TABLET ORAL at 07:35

## 2025-04-22 RX ADMIN — POLYMYXIN B SULFATE AND TRIMETHOPRIM 1 DROP: 10000; 1 SOLUTION OPHTHALMIC at 11:53

## 2025-04-22 RX ADMIN — LISINOPRIL 40 MG: 20 TABLET ORAL at 07:36

## 2025-04-22 RX ADMIN — CLONIDINE HYDROCHLORIDE 0.1 MG: 0.1 TABLET ORAL at 17:25

## 2025-04-22 RX ADMIN — ATORVASTATIN CALCIUM 20 MG: 20 TABLET, FILM COATED ORAL at 07:35

## 2025-04-22 RX ADMIN — ACETAMINOPHEN 650 MG: 325 TABLET ORAL at 20:47

## 2025-04-22 RX ADMIN — CETIRIZINE HYDROCHLORIDE 10 MG: 10 TABLET, FILM COATED ORAL at 07:36

## 2025-04-22 RX ADMIN — POLYMYXIN B SULFATE AND TRIMETHOPRIM 1 DROP: 10000; 1 SOLUTION OPHTHALMIC at 17:25

## 2025-04-22 RX ADMIN — ESCITALOPRAM OXALATE 20 MG: 10 TABLET ORAL at 07:35

## 2025-04-22 RX ADMIN — HYDROXYZINE HYDROCHLORIDE 50 MG: 50 TABLET, FILM COATED ORAL at 07:38

## 2025-04-22 RX ADMIN — PRAZOSIN HYDROCHLORIDE 3 MG: 1 CAPSULE ORAL at 20:48

## 2025-04-22 RX ADMIN — HYDROCHLOROTHIAZIDE 25 MG: 25 TABLET ORAL at 07:35

## 2025-04-22 RX ADMIN — ACETAMINOPHEN 650 MG: 325 TABLET ORAL at 07:36

## 2025-04-22 RX ADMIN — POLYMYXIN B SULFATE AND TRIMETHOPRIM 1 DROP: 10000; 1 SOLUTION OPHTHALMIC at 07:06

## 2025-04-22 RX ADMIN — TRAZODONE HYDROCHLORIDE 200 MG: 100 TABLET ORAL at 20:47

## 2025-04-22 RX ADMIN — HYDROXYZINE HYDROCHLORIDE 50 MG: 50 TABLET, FILM COATED ORAL at 20:48

## 2025-04-22 ASSESSMENT — PAIN DESCRIPTION - LOCATION
LOCATION: BACK
LOCATION: BACK

## 2025-04-22 ASSESSMENT — PAIN DESCRIPTION - ORIENTATION
ORIENTATION: LOWER
ORIENTATION: LOWER

## 2025-04-22 ASSESSMENT — PAIN SCALES - GENERAL
PAINLEVEL_OUTOF10: 2
PAINLEVEL_OUTOF10: 6
PAINLEVEL_OUTOF10: 5

## 2025-04-22 ASSESSMENT — PAIN SCALES - WONG BAKER: WONGBAKER_NUMERICALRESPONSE: HURTS A LITTLE BIT

## 2025-04-22 NOTE — BH NOTE
GROUP THERAPY PROGRESS NOTE    Patient is participating in psychoeducation group.    Group time: 30 minutes    Personal goal for participation: to develop an understanding of Trust     Goal orientation: Personal    Group therapy participation:  Active     Therapeutic interventions reviewed and discussed:  Group members were guided through learning about B.R.A.V.I.N.G.which is an acronym for Trust.  Members gained an understanding of how trust may be established in small moments. Members watched Umm King Talk about Trust and then engaged in discussion. Handouts provided    Impression of participation:  Pt was present and engaged in group discussion and activity.      Katherine Gillies, MSW, [facility]-A

## 2025-04-22 NOTE — PLAN OF CARE
Problem: Depression  Goal: Will be euthymic at discharge  Description: INTERVENTIONS:1. Administer medication as ordered2. Provide emotional support via 1:1 interaction with staff3. Encourage involvement in milieu/groups/activities4. Monitor for social isolation  Outcome: Progressing  Note: Out on unit this am appears flat to sad. Reports sleep slightly improved and anxiety is elevated but is tolerable. Accepted an atarax prn this am. Daily goal is to increase engagement. Describes hopelessness. No suicidal plan, no self harming behaviors. Difficulty accepting community resources he is available for. Continues to state he applied for medicaid however at this time his application is pending in process. Staff will continue to offer support and reassurance.

## 2025-04-22 NOTE — BH NOTE
GROUP THERAPY PROGRESS NOTE    Patient is participating in coping skills group.     Group time: 30 minutes    Personal goal for participation: To gain an understanding of how music effects mood and emotion.    Goal orientation: Personal    Group therapy participation: Active     Therapeutic interventions reviewed and discussed: Pts had the opportunity to watch a video about the effects of music and mental health.   Handouts provided     Impression of participation: Pt was present and engaged in group discussion. Pt added insight to group topic. Pt was calm, cooperative.     Katherine Gillies MSW, [facility]-A

## 2025-04-22 NOTE — PLAN OF CARE
Problem: ABCDS Injury Assessment  Goal: Absence of physical injury  Flowsheets (Taken 4/22/2025 1486)  Absence of Physical Injury: Implement safety measures based on patient assessment     Pt appears to be resting in bed in no apparent distress, respirations even and unlabored. No voiced concerns. Standard fall precautions maintained. Q15m rounds for safety continued per provider.

## 2025-04-22 NOTE — PROGRESS NOTES
SPIRITUALITY GROUP      Meeting Topic: Methods of Spiritual Pathways    Meeting Time:  45-60 minutes    Meeting Goal: Patients will describe their personal definition of spirituality. The group will identify how their spiritual or Nondenominational beliefs are relevant to their mental health. The  will discuss various approaches to making spiritual connections and invite the group to explore a new spiritual practice. The group will conclude with a guided meditation.     Today's meeting focus: Meditation & Prayer    Justino Preciado attended and participated in the group appropriately.        For additional spiritual care, please contact the  on-call at ([phone]).    Jessica Beltre MDiv, MS, [facility]  Staff   Spiritual Health Services

## 2025-04-22 NOTE — BH NOTE
GROUP THERAPY PROGRESS NOTE    Pt. actively participated in group led by nursing students.    Katherine Gillies, MSW, [facility]-A

## 2025-04-22 NOTE — PROGRESS NOTES
PSYCHIATRIC PROGRESS NOTE    Chief Complaint: 'the anxiety'    Length of Stay: 8 Days    Interval History:  04/22/2025  Nursing report patient slept well. He is taking his medications and eating well.  Mr Preciado had no acute events overnight. He complains of struggling with anxiety.  Justino reports struggling with not feeling safe, but that this is improving. No AH. He is concerned about his insurance.    04/21/2025  Mr. Preciado shares that his anxiety is significant and bothersome. He feels restless but is hopeful. No AH.  He feels that his dreams are more vivid, but no rhonda nightmares.  He continues to take his medications and reports no side-effects.    4/20/25 (Weekend coverage) Mr. Preciado reports better sleep last night and nightmares were \"better, but still there.\" Denies active SI or HI. Reports anxiety is lower. Open to the idea of increasing prazosin for nightmares as having no side effects.    4/19/25 - (Weekend coverage)Mr. Preciado reports good sleep until about 1:30 when he woke up to use the bathroom and couldn't fall back asleep. States he is still having nightmares. Reports he is \"pretty much scared to go back to sleep\" due to nightmares. Denies active SI or HI. Compliant with medications.    4/18/25 - Mr. Preciado is unchanged. He remains isolative to his room and is minimally interactive. BP remains elevated and we will increase his dosage of Lisinopril. Says he had bad dreams last night which were upsetting to him and feels it spoils his entire day. Mood is \"down\" and thoughts of suicide are still present. He ascribes some of his symptoms to Prazosin and wants to dosage reduced. Denies any Ah or Vh today. Nursing staff note that he seems to be depressed and does not interact much.     04/17/2025  Nursing report patient had poor sleep last night.  Justino says that he is attending in groups to benefit his mental health. He is feeling tired today and describe struggling with ruminations about his past. He  feels hopeless and helpless, but wants to do his part. He was wondering if medication changes have to do with his worsening sleep.  He says trazodone last night did help him fall asleep, but wasn't able to stay asleep long.    04/16/2025  Patient says that his sleep was poor. He wasn't able to sleep until quite late in the morning.  He tolerated starting lexapro 5mg po yesterday. He also benefited from starting prazosin. Justino experiences recurrent thoughts of suicide, but feels they are decreasing.  No psychotic symptoms.      Past Medical History:  Past Medical History:   Diagnosis Date    Hypertension           hydroCHLOROthiazide  25 mg Oral Daily    escitalopram  20 mg Oral Daily    traZODone  150 mg Oral Nightly    prazosin  3 mg Oral Nightly    trimethoprim-polymyxin b  1 drop Both Eyes 4 times per day    lisinopril  40 mg Oral Daily    cetirizine  10 mg Oral Daily    amLODIPine  10 mg Oral Daily    atorvastatin  20 mg Oral Daily       Labs:  Lab Results   Component Value Date/Time    WBC 5.4 04/13/2025 06:22 PM    HGB 14.3 04/13/2025 06:22 PM    HCT 42.4 04/13/2025 06:22 PM     04/13/2025 06:22 PM    MCV 91.6 04/13/2025 06:22 PM      Lab Results   Component Value Date/Time     04/13/2025 06:22 PM    K 3.9 04/13/2025 06:22 PM     04/13/2025 06:22 PM    CO2 29 04/13/2025 06:22 PM    BUN 11 04/13/2025 06:22 PM    GLOB 3.8 04/13/2025 06:22 PM    ALT 19 04/13/2025 06:22 PM          Vitals:    04/22/25 0735   BP: (!) 157/91   Pulse: 74   Resp: 15   Temp: 97.7 °F (36.5 °C)   SpO2: 99%        Physical Exam:  Body habitus: Body mass index is 31.8 kg/m².  Musculoskeletal system: normal gait  Tremor - neg  Cog wheeling - neg    Mental Status Exam:  Mr Preciado is a 60 y.o. YO male w/gray hair is dressed in hospital gown. He is engaged in the evaluation and maintains fair eye contact.  Patient maintains eye contact throughout the evaluation  Psychomotor activity: WNL  Speech is spontaneous, with NL

## 2025-04-22 NOTE — BH NOTE
PRN Medication Documentation    Specific patient behavior that led to need for PRN medication: Elevated BP (168/93)  Staff interventions attempted prior to PRN being given: Rest  PRN medication given: Clonidine  Patient response/effectiveness of PRN medication: NAIMA aware

## 2025-04-23 PROCEDURE — 6370000000 HC RX 637 (ALT 250 FOR IP): Performed by: PSYCHIATRY & NEUROLOGY

## 2025-04-23 PROCEDURE — 6370000000 HC RX 637 (ALT 250 FOR IP): Performed by: NURSE PRACTITIONER

## 2025-04-23 PROCEDURE — 1240000000 HC EMOTIONAL WELLNESS R&B

## 2025-04-23 RX ORDER — SENNA AND DOCUSATE SODIUM 50; 8.6 MG/1; MG/1
1 TABLET, FILM COATED ORAL 2 TIMES DAILY
Status: DISCONTINUED | OUTPATIENT
Start: 2025-04-23 | End: 2025-04-25

## 2025-04-23 RX ORDER — HYDROCHLOROTHIAZIDE 25 MG/1
25 TABLET ORAL ONCE
Status: COMPLETED | OUTPATIENT
Start: 2025-04-23 | End: 2025-04-23

## 2025-04-23 RX ORDER — BISACODYL 10 MG
10 SUPPOSITORY, RECTAL RECTAL ONCE
Status: DISCONTINUED | OUTPATIENT
Start: 2025-04-23 | End: 2025-04-24

## 2025-04-23 RX ORDER — HYDROCHLOROTHIAZIDE 25 MG/1
50 TABLET ORAL DAILY
Status: DISCONTINUED | OUTPATIENT
Start: 2025-04-24 | End: 2025-04-25 | Stop reason: HOSPADM

## 2025-04-23 RX ADMIN — POLYMYXIN B SULFATE AND TRIMETHOPRIM 1 DROP: 10000; 1 SOLUTION OPHTHALMIC at 00:18

## 2025-04-23 RX ADMIN — HYDROCHLOROTHIAZIDE 25 MG: 25 TABLET ORAL at 11:42

## 2025-04-23 RX ADMIN — CETIRIZINE HYDROCHLORIDE 10 MG: 10 TABLET, FILM COATED ORAL at 08:10

## 2025-04-23 RX ADMIN — PRAZOSIN HYDROCHLORIDE 3 MG: 1 CAPSULE ORAL at 20:48

## 2025-04-23 RX ADMIN — HYDROCHLOROTHIAZIDE 25 MG: 25 TABLET ORAL at 08:10

## 2025-04-23 RX ADMIN — SENNOSIDES AND DOCUSATE SODIUM 1 TABLET: 50; 8.6 TABLET ORAL at 20:48

## 2025-04-23 RX ADMIN — TRAZODONE HYDROCHLORIDE 200 MG: 100 TABLET ORAL at 20:48

## 2025-04-23 RX ADMIN — LISINOPRIL 40 MG: 20 TABLET ORAL at 08:10

## 2025-04-23 RX ADMIN — POLYMYXIN B SULFATE AND TRIMETHOPRIM 1 DROP: 10000; 1 SOLUTION OPHTHALMIC at 11:42

## 2025-04-23 RX ADMIN — AMLODIPINE BESYLATE 10 MG: 5 TABLET ORAL at 08:10

## 2025-04-23 RX ADMIN — POLYMYXIN B SULFATE AND TRIMETHOPRIM 1 DROP: 10000; 1 SOLUTION OPHTHALMIC at 04:52

## 2025-04-23 RX ADMIN — HYDROXYZINE HYDROCHLORIDE 50 MG: 50 TABLET, FILM COATED ORAL at 04:52

## 2025-04-23 RX ADMIN — POLYMYXIN B SULFATE AND TRIMETHOPRIM 1 DROP: 10000; 1 SOLUTION OPHTHALMIC at 17:43

## 2025-04-23 RX ADMIN — POLYMYXIN B SULFATE AND TRIMETHOPRIM 1 DROP: 10000; 1 SOLUTION OPHTHALMIC at 22:01

## 2025-04-23 RX ADMIN — SENNOSIDES 8.6 MG: 8.6 TABLET, FILM COATED ORAL at 08:10

## 2025-04-23 RX ADMIN — ATORVASTATIN CALCIUM 20 MG: 20 TABLET, FILM COATED ORAL at 08:10

## 2025-04-23 RX ADMIN — ESCITALOPRAM OXALATE 20 MG: 10 TABLET ORAL at 08:10

## 2025-04-23 RX ADMIN — SENNOSIDES AND DOCUSATE SODIUM 1 TABLET: 50; 8.6 TABLET ORAL at 11:42

## 2025-04-23 ASSESSMENT — PAIN DESCRIPTION - LOCATION: LOCATION: BACK

## 2025-04-23 ASSESSMENT — PAIN DESCRIPTION - ORIENTATION: ORIENTATION: LOWER

## 2025-04-23 ASSESSMENT — PAIN SCALES - GENERAL: PAINLEVEL_OUTOF10: 3

## 2025-04-23 NOTE — INTERDISCIPLINARY ROUNDS
Behavioral Health Interdisciplinary Rounds     Patient Name: Justino Preciado  Age: 60 y.o.  Room/Bed:  [facility]/  Primary Diagnosis: Schizoaffective disorder (HCC)  Admission Status: Voluntary  Readmission within 30 days: No  Power of  in place: No  Patient requires a blocked bed: No          Reason for blocked bed: N/A  Sleep hours: 7+  Participation in Care/Groups: Yes  Medication Compliant?: Yes  PRNS (last 24 hours): Antianxiety, Anticholinergic, and Pain  Restraints (last 24 hours): No  ____________________________:     24 hour chart check complete: Yes    _______________________________________________    Patient goal(s) for today: meet with treatment team   Treatment team focus/goals: Plan to work on a SA rehab program / plan to increase BP medications   Progress note: He slept 7+ hours last night, BP still high, denies SI, denies any issues with his medications, he reports constipation -     Financial concerns/prescription coverage: no benefits   Family contact: no family contact     Discharge plan: he is homeless   Access to weapons: no  Outpatient provider(s): SA rehab program     LOS:  9  Expected LOS: TBD     Participating treatment team members: Justino Preciado, ZORAIDA Harmon- Dr. Jamari Reyes, PharmD. - Shoshana Francis RN

## 2025-04-23 NOTE — BH NOTE
GROUP THERAPY PROGRESS NOTE    Pt. actively participated in group led by nursing students.    Katherine Gillies, MSW, [facility]-A

## 2025-04-23 NOTE — MANAGEMENT PLAN
Justino Preciado #[id number] (CSN:[id number]) (60 y.o. M) (Adm: 04/13/25)  [id number]  PCP    None  Demographics  CommentAddress   [address] [place] [facility] [place] [address]    Home Phone   [phone]    Work Phone       Mobile Phone   [phone]             Social Security Number       Insurance Information       Marital Status   Single    Orthodoxy   Quaker

## 2025-04-23 NOTE — BH NOTE
GROUP THERAPY PROGRESS NOTE    Patient is participating in Recreation therapy group.    Group time: 30 minutes    Personal goal for participation: To engage in activities designed to promote brain health.     Goal orientation: Personal    Group therapy participation: active     Therapeutic interventions reviewed and discussed:  Group members were given the opportunity to engage in activities designed to stimulate the brain. Members were able to use critical thinking skills as well as socialization skills.  Members completed various puzzles/fill in the blanks/trivia together.     Impression of participation: Pt was present and engaged in group discussion/activity. PT was calm, cooperative         Katherine Gillies, MSW, [facility]

## 2025-04-23 NOTE — PLAN OF CARE
[id number] pt resting in room in bed, alternately out in milieu for mealtime. Pt has been active in groups today, \"I go to all the groups.\" Pt less somatic this evening. He is starting to have BMs so ultimately declined suppository WA ordered once. Pt is compliant with medications, and plan of care.     Problem: Depression  Goal: Will be euthymic at discharge  Description: INTERVENTIONS:  1. Administer medication as ordered  2. Provide emotional support via 1:1 interaction with staff  3. Encourage involvement in milieu/groups/activities  4. Monitor for social isolation  Outcome: Progressing    Problem: ABCDS Injury Assessment  Goal: Absence of physical injury  Outcome: Progressing

## 2025-04-23 NOTE — PLAN OF CARE
Problem: Depression  Goal: Will be euthymic at discharge  Description: INTERVENTIONS:1. Administer medication as ordered2. Provide emotional support via 1:1 interaction with staff3. Encourage involvement in milieu/groups/activities4. Monitor for social isolation  Outcome: Progressing  Note: Patient is participating in treatment plan, medications discussed.  C/o constipated, increased BP, medication ordered.  Discharge discussed.  Patient will call facility to discuss rehab.

## 2025-04-23 NOTE — PROGRESS NOTES
PSYCHIATRIC PROGRESS NOTE    Chief Complaint: 'a lot things going on.'    Length of Stay: 9 Days    Interval History:  04/23/2025  Patient complains of constipation and being concerned about his BP.  Justino is feeling that his sleep is improving so as his sense of safety.  He makes no suicidal or homicidal statements.      04/22/2025  Nursing report patient slept well. He is taking his medications and eating well.  Mr Preciado had no acute events overnight. He complains of struggling with anxiety.  Justino reports struggling with not feeling safe, but that this is improving. No AH. He is concerned about his insurance.    04/21/2025  Mr. Preciado shares that his anxiety is significant and bothersome. He feels restless but is hopeful. No AH.  He feels that his dreams are more vivid, but no rhonda nightmares.  He continues to take his medications and reports no side-effects.    4/20/25 (Weekend coverage) Mr. Preciado reports better sleep last night and nightmares were \"better, but still there.\" Denies active SI or HI. Reports anxiety is lower. Open to the idea of increasing prazosin for nightmares as having no side effects.    4/19/25 - (Weekend coverage)Mr. Preciado reports good sleep until about 1:30 when he woke up to use the bathroom and couldn't fall back asleep. States he is still having nightmares. Reports he is \"pretty much scared to go back to sleep\" due to nightmares. Denies active SI or HI. Compliant with medications.    4/18/25 - Mr. Preciado is unchanged. He remains isolative to his room and is minimally interactive. BP remains elevated and we will increase his dosage of Lisinopril. Says he had bad dreams last night which were upsetting to him and feels it spoils his entire day. Mood is \"down\" and thoughts of suicide are still present. He ascribes some of his symptoms to Prazosin and wants to dosage reduced. Denies any Ah or Vh today. Nursing staff note that he seems to be depressed and does not interact much.  including consultant notes, ancillary staff notes, nurses and tech notes, labs and vitals.    I certify that this patients inpatient psychiatric hospital services furnished since the previous certification were, and continue to be, required for treatment that could reasonably be expected to improve the patient's condition, or for diagnostic study, and that the patient continues to need, on a daily basis, active treatment furnished directly by or requiring the supervision of inpatient psychiatric facility personnel. In addition, the hospital records show that services furnished were intensive treatment services, admission or related services, or equivalent services.

## 2025-04-23 NOTE — BH NOTE
GROUP THERAPY PROGRESS NOTE    Patient is participating in psychoeducation group.    Group time: 30 mins.    Personal goal for participation: to develop an understanding of cognitive distortions and how to alter our thinking.     Goal orientation: Personal    Group therapy participation:  Active     Therapeutic interventions reviewed and discussed:  Group members were guided through learning about cognitive distortions through discussion and video. Members gained an understanding of how these types of distortions effect perception, relationships, and overall mental health. Members were guided through learning about methods that can be used for changing negative thought patterns. Handouts provided.    Impression of participation:  Pt was present and engaged in group discussion.     Katherine Gillies, MSW, [facility]-A

## 2025-04-24 PROCEDURE — 6370000000 HC RX 637 (ALT 250 FOR IP): Performed by: NURSE PRACTITIONER

## 2025-04-24 PROCEDURE — 6370000000 HC RX 637 (ALT 250 FOR IP): Performed by: PSYCHIATRY & NEUROLOGY

## 2025-04-24 PROCEDURE — 1240000000 HC EMOTIONAL WELLNESS R&B

## 2025-04-24 RX ORDER — BISACODYL 10 MG
10 SUPPOSITORY, RECTAL RECTAL ONCE
Status: DISCONTINUED | OUTPATIENT
Start: 2025-04-24 | End: 2025-04-25 | Stop reason: HOSPADM

## 2025-04-24 RX ADMIN — POLYMYXIN B SULFATE AND TRIMETHOPRIM 1 DROP: 10000; 1 SOLUTION OPHTHALMIC at 22:00

## 2025-04-24 RX ADMIN — PRAZOSIN HYDROCHLORIDE 3 MG: 1 CAPSULE ORAL at 21:08

## 2025-04-24 RX ADMIN — SENNOSIDES AND DOCUSATE SODIUM 1 TABLET: 50; 8.6 TABLET ORAL at 21:08

## 2025-04-24 RX ADMIN — LISINOPRIL 40 MG: 20 TABLET ORAL at 08:30

## 2025-04-24 RX ADMIN — SENNOSIDES AND DOCUSATE SODIUM 1 TABLET: 50; 8.6 TABLET ORAL at 08:30

## 2025-04-24 RX ADMIN — ATORVASTATIN CALCIUM 20 MG: 20 TABLET, FILM COATED ORAL at 08:29

## 2025-04-24 RX ADMIN — POLYMYXIN B SULFATE AND TRIMETHOPRIM 1 DROP: 10000; 1 SOLUTION OPHTHALMIC at 07:08

## 2025-04-24 RX ADMIN — HYDROXYZINE HYDROCHLORIDE 50 MG: 50 TABLET, FILM COATED ORAL at 10:31

## 2025-04-24 RX ADMIN — ESCITALOPRAM OXALATE 20 MG: 10 TABLET ORAL at 08:30

## 2025-04-24 RX ADMIN — CETIRIZINE HYDROCHLORIDE 10 MG: 10 TABLET, FILM COATED ORAL at 08:30

## 2025-04-24 RX ADMIN — AMLODIPINE BESYLATE 10 MG: 5 TABLET ORAL at 08:29

## 2025-04-24 RX ADMIN — POLYMYXIN B SULFATE AND TRIMETHOPRIM 1 DROP: 10000; 1 SOLUTION OPHTHALMIC at 16:56

## 2025-04-24 RX ADMIN — POLYMYXIN B SULFATE AND TRIMETHOPRIM 1 DROP: 10000; 1 SOLUTION OPHTHALMIC at 13:46

## 2025-04-24 RX ADMIN — TRAZODONE HYDROCHLORIDE 200 MG: 100 TABLET ORAL at 21:08

## 2025-04-24 RX ADMIN — HYDROCHLOROTHIAZIDE 50 MG: 25 TABLET ORAL at 08:30

## 2025-04-24 NOTE — BH NOTE
GROUP THERAPY PROGRESS NOTE    Patient is participating in Expressions group.     Group time: 10 minutes    Personal goal for participation: To engage with SW and peers about likes/dislikes/needs on unit     Goal orientation: Personal    Group therapy participation: Active      Therapeutic interventions reviewed and discussed: Group allows members to interact with each other and SW to discuss concerns on unit     Impression of participation: Pt was present and engaged in group. Pt interacted with peers and .     Katherine Gillies MSW, [facility]-A

## 2025-04-24 NOTE — PLAN OF CARE
Patient resting in bed with eyes closed. No needs voiced to staff at this time, no respiratory distress noted. Patient in NAD, and monitored with 15 minute safety rounds.    Problem: Depression  Goal: Will be euthymic at discharge  Description: INTERVENTIONS:1. Administer medication as ordered2. Provide emotional support via 1:1 interaction with staff3. Encourage involvement in milieu/groups/activities4. Monitor for social isolation  4/24/2025 0007 by Jennifer Ricardo, RN  Outcome: Progressing     Problem: ABCDS Injury Assessment  Goal: Absence of physical injury  4/24/2025 0007 by Jennifer Ricardo, RN  Outcome: Progressing

## 2025-04-24 NOTE — BH NOTE
GROUP THERAPY PROGRESS NOTE    Patient is participating in self-care group.    Group time: 30 minutes    Personal goal for participation: Develop an understanding of sleep hygiene    Goal orientation: Personal    Group therapy participation: passive     Therapeutic interventions reviewed and discussed: Group members were able to develop an understanding of how sleep patterns effect mental health. Members were guided through developing an understanding of sleep hygiene. Members gained insight through discussion about current maladaptive sleep habits and ways to improve sleep quality. Sleep hygiene guideline worksheet provided.    Impression of participation: SW provided pt with handouts to complete independently due to acuity      Katherine Gillies MSW, [facility]-A

## 2025-04-24 NOTE — PLAN OF CARE
Problem: Depression  Goal: Will be euthymic at discharge  Description: INTERVENTIONS:1. Administer medication as ordered2. Provide emotional support via 1:1 interaction with staff3. Encourage involvement in milieu/groups/activities4. Monitor for social isolation  4/24/2025 1114 by Sheila Reina, RN  Outcome: Progressing  Note: Out on unit passively engaged, mood and and affect flat to sad, continues to report feeling overwhelmed and anxious. Constipation continues. Verbalized understanding of medications available, motivated to work with insurance to coordinate your sobriety plan. Staff focus is offering support

## 2025-04-24 NOTE — INTERDISCIPLINARY ROUNDS
Behavioral Health Interdisciplinary Rounds     Patient Name: Justino Preciado  Age: 60 y.o.  Room/Bed:  [facility]  Primary Diagnosis: Schizoaffective disorder (HCC)   Admission Status: Voluntary     Readmission within 30 days:   Power of  in place:   Patient requires a blocked bed: No          Reason for blocked bed:   Sleep hours:   Flu vaccine : not received this season      Participation in Care/Groups:  Yes  Medication Compliant?:  Medication Compliant: Yes  PRNS (last 24 hours):  PRNS: Antianxiety and CONSTIPATION     Restraints (last 24 hours):  NO  __________________________________________________  OQ Admission Analysis Survey completed:  OQ Admission Analysis Survey score:    __________________________________________________     Alcohol screening (AUDIT) completed -     Score:   Tobacco :  Illegal Drugs use:   CSSR Lifetime:     24 hour chart check complete: Yes    _______________________________________________    Patient goal(s) for today:   Treatment team focus/goals:   Progress note:      Spiritual Care Consult:   Financial concerns/prescription coverage:    Family contact:                        Family requesting physician contact today:    Discharge plan:   Access to weapons :                                                              Outpatient provider(s):     LOS:  10  Expected LOS:     Participating treatment team members: Justino Preciado, * (assigned SW),

## 2025-04-24 NOTE — PROGRESS NOTES
PSYCHIATRIC PROGRESS NOTE    Chief Complaint: 'a lot things going on.'    Length of Stay: 10 Days    Interval History:  04/24/2025  Nursing report patient has been attending groups and taking his medications. He is eating his meals.  He did have a BM yesterday but would like to use the suppository today.  Mr. Preciado slept well until 3am. Even though he woke up, he was able to fall asleep without incident.  He feels that he is improving overall. He denies having SI or HI.   Not experiencing AH.    04/23/2025  Patient complains of constipation and being concerned about his BP.  Justino is feeling that his sleep is improving so as his sense of safety.  He makes no suicidal or homicidal statements.    04/22/2025  Nursing report patient slept well. He is taking his medications and eating well.  Mr Preciado had no acute events overnight. He complains of struggling with anxiety.  Justino reports struggling with not feeling safe, but that this is improving. No AH. He is concerned about his insurance.    04/21/2025  Mr. Preciado shares that his anxiety is significant and bothersome. He feels restless but is hopeful. No AH.  He feels that his dreams are more vivid, but no rhonda nightmares.  He continues to take his medications and reports no side-effects.    4/20/25 (Weekend coverage) Mr. Preciado reports better sleep last night and nightmares were \"better, but still there.\" Denies active SI or HI. Reports anxiety is lower. Open to the idea of increasing prazosin for nightmares as having no side effects.    4/19/25 - (Weekend coverage)Mr. Preciado reports good sleep until about 1:30 when he woke up to use the bathroom and couldn't fall back asleep. States he is still having nightmares. Reports he is \"pretty much scared to go back to sleep\" due to nightmares. Denies active SI or HI. Compliant with medications.    4/18/25 - Mr. Preciado is unchanged. He remains isolative to his room and is minimally interactive. BP remains elevated and we will

## 2025-04-24 NOTE — PLAN OF CARE
Problem: Depression  Goal: Will be euthymic at discharge  Description: INTERVENTIONS:1. Administer medication as ordered2. Provide emotional support via 1:1 interaction with staff3. Encourage involvement in milieu/groups/activities4. Monitor for social isolation  4/24/2025 1757 by Gail Francis, RN  Outcome: Progressing  Note: Patient is out on the unit, appropriate with staff and peers, med and meal compliant, flat affect, appears to be more engaged in his treatment and discharge to a rehab.

## 2025-04-24 NOTE — BH NOTE
GROUP THERAPY PROGRESS NOTE    Patient is participating in recreational therapy group.    Group time: 30 minutes    Personal goal for participation: To engage in “finish the phrase” and other puzzle activities.    Goal orientation: Personal    Group therapy participation: Active    Therapeutic interventions reviewed and discussed:  Group members were given the opportunity to engage in the “finish the phrase” activity. Members were able to exercise socialization and memory skills. Members interacted with peers. Handout provided.     Impression of participation: Pt was present and engaged in group discussion/activity. Pt was calm, cooperative     Katherine Gillies, MSW, [facility]

## 2025-04-24 NOTE — BH NOTE
GROUP THERAPY PROGRESS NOTE    Patient is participating in psychoeducation group.    Group time: 30 minutes    Personal goal for participation: To gain an understanding of the 12 basic irrational beliefs and identify personal interpretations as they apply.    Goal orientation: Personal    Group therapy participation: Active     Therapeutic interventions reviewed and discussed:  Group members were guided through developing an understanding of irrational beliefs and how they affect thought processes and behaviors. Members completed and activity and then engaged in discussion. Handouts provided.    Impression of participation: Pt was present and engaged in group discussion. Pt added insight to topic. Pt was calm, cooperative.       Katherine Gillies, MSW, [facility]

## 2025-04-24 NOTE — BH NOTE
PRN Medication Documentation    Specific patient behavior that led to need for PRN medication: Anxiety  Staff interventions attempted prior to PRN being given: Therapeutic talk  PRN medication given: Atarax  Patient response/effectiveness of PRN medication: TL aware

## 2025-04-25 VITALS
HEART RATE: 77 BPM | OXYGEN SATURATION: 98 % | HEIGHT: 73 IN | BODY MASS INDEX: 31.94 KG/M2 | TEMPERATURE: 98.4 F | SYSTOLIC BLOOD PRESSURE: 151 MMHG | RESPIRATION RATE: 16 BRPM | WEIGHT: 241 LBS | DIASTOLIC BLOOD PRESSURE: 91 MMHG

## 2025-04-25 PROCEDURE — 6370000000 HC RX 637 (ALT 250 FOR IP): Performed by: PSYCHIATRY & NEUROLOGY

## 2025-04-25 PROCEDURE — 6370000000 HC RX 637 (ALT 250 FOR IP): Performed by: NURSE PRACTITIONER

## 2025-04-25 RX ORDER — LOPERAMIDE HYDROCHLORIDE 2 MG/1
2 CAPSULE ORAL 4 TIMES DAILY PRN
Status: DISCONTINUED | OUTPATIENT
Start: 2025-04-25 | End: 2025-04-25 | Stop reason: HOSPADM

## 2025-04-25 RX ORDER — ATORVASTATIN CALCIUM 20 MG/1
20 TABLET, FILM COATED ORAL DAILY
Qty: 30 TABLET | Refills: 0 | Status: SHIPPED | OUTPATIENT
Start: 2025-04-25 | End: 2025-05-25

## 2025-04-25 RX ORDER — TRAZODONE HYDROCHLORIDE 100 MG/1
200 TABLET ORAL NIGHTLY
Qty: 60 TABLET | Refills: 0 | Status: SHIPPED | OUTPATIENT
Start: 2025-04-25 | End: 2025-05-25

## 2025-04-25 RX ORDER — LISINOPRIL 40 MG/1
40 TABLET ORAL DAILY
Qty: 30 TABLET | Refills: 0 | Status: SHIPPED | OUTPATIENT
Start: 2025-04-26 | End: 2025-05-26

## 2025-04-25 RX ORDER — ESCITALOPRAM OXALATE 20 MG/1
20 TABLET ORAL DAILY
Qty: 30 TABLET | Refills: 0 | Status: SHIPPED | OUTPATIENT
Start: 2025-04-26 | End: 2025-05-26

## 2025-04-25 RX ORDER — FLUTICASONE PROPIONATE 50 MCG
1 SPRAY, SUSPENSION (ML) NASAL DAILY PRN
Qty: 9.9 ML | Refills: 0 | Status: SHIPPED | OUTPATIENT
Start: 2025-04-25 | End: 2025-06-01

## 2025-04-25 RX ORDER — PRAZOSIN HYDROCHLORIDE 1 MG/1
3 CAPSULE ORAL NIGHTLY
Qty: 90 CAPSULE | Refills: 0 | Status: SHIPPED | OUTPATIENT
Start: 2025-04-25 | End: 2025-05-25

## 2025-04-25 RX ORDER — HYDROCHLOROTHIAZIDE 50 MG/1
50 TABLET ORAL DAILY
Qty: 30 TABLET | Refills: 0 | Status: SHIPPED | OUTPATIENT
Start: 2025-04-26 | End: 2025-05-26

## 2025-04-25 RX ORDER — AMLODIPINE BESYLATE 10 MG/1
10 TABLET ORAL DAILY
Qty: 30 TABLET | Refills: 0 | Status: SHIPPED | OUTPATIENT
Start: 2025-04-25 | End: 2025-05-25

## 2025-04-25 RX ORDER — POLYMYXIN B SULFATE AND TRIMETHOPRIM 1; 10000 MG/ML; [USP'U]/ML
1 SOLUTION OPHTHALMIC EVERY 6 HOURS
Qty: 1 EACH | Refills: 0 | Status: SHIPPED | OUTPATIENT
Start: 2025-04-25 | End: 2025-04-26

## 2025-04-25 RX ORDER — CETIRIZINE HYDROCHLORIDE 10 MG/1
10 TABLET ORAL DAILY
Qty: 30 TABLET | Refills: 0 | Status: SHIPPED | OUTPATIENT
Start: 2025-04-26 | End: 2025-05-26

## 2025-04-25 RX ORDER — SENNA AND DOCUSATE SODIUM 50; 8.6 MG/1; MG/1
1 TABLET, FILM COATED ORAL DAILY PRN
Qty: 30 TABLET | Refills: 0 | Status: SHIPPED | OUTPATIENT
Start: 2025-04-25 | End: 2025-05-25

## 2025-04-25 RX ADMIN — POLYMYXIN B SULFATE AND TRIMETHOPRIM 1 DROP: 10000; 1 SOLUTION OPHTHALMIC at 06:02

## 2025-04-25 RX ADMIN — ATORVASTATIN CALCIUM 20 MG: 20 TABLET, FILM COATED ORAL at 08:23

## 2025-04-25 RX ADMIN — POLYETHYLENE GLYCOL 3350 17 GRAM ORAL POWDER PACKET 17 G: at 08:27

## 2025-04-25 RX ADMIN — LISINOPRIL 40 MG: 20 TABLET ORAL at 08:23

## 2025-04-25 RX ADMIN — LOPERAMIDE HYDROCHLORIDE 2 MG: 2 CAPSULE ORAL at 12:33

## 2025-04-25 RX ADMIN — SENNOSIDES AND DOCUSATE SODIUM 1 TABLET: 50; 8.6 TABLET ORAL at 08:23

## 2025-04-25 RX ADMIN — AMLODIPINE BESYLATE 10 MG: 5 TABLET ORAL at 08:23

## 2025-04-25 RX ADMIN — CETIRIZINE HYDROCHLORIDE 10 MG: 10 TABLET, FILM COATED ORAL at 08:23

## 2025-04-25 RX ADMIN — HYDROCHLOROTHIAZIDE 50 MG: 25 TABLET ORAL at 08:23

## 2025-04-25 RX ADMIN — ESCITALOPRAM OXALATE 20 MG: 10 TABLET ORAL at 08:23

## 2025-04-25 NOTE — TRANSITION OF CARE
Behavioral Health Transition Record    Patient Name: Justino Preciado  YOB: 1965   Medical Record Number: [id number]  Date of Admission: 4/13/2025  6:09 PM   Date of Discharge: 4/25/25    Attending Provider: Huber Kauffman MD   Discharging Provider: Dr Kauffman  To contact this individual call [phone] and ask the  to page.  If unavailable, ask to be transferred to Behavioral Health Provider on call.  A Behavioral Health Provider will be available on call 24/7 and during holidays.    Primary Care Provider: No primary care provider on file.    No Known Allergies    Reason for Admission: Justino Preciado is a 60 y.o. White (non-) male w/pmh of HTN, HLD, and past psychiatric history of MDD, recurrent, severe, without psychosis, and cocaine use disorder presented to [facility] on 4/13/2025 who presented for worsening suicidal thoughts and plans to end his life.     Justino says that he's been struggling with depressive symptoms ever since he moved from [place], and has been attempting to end his life. He reports having walked in front of a bus. He came to hospital as a last resort before he attempts to end his life. He reports being depressed, experiencing recurrent nightmares and inability to motivate. He's been without resources and living in his care for the past 6 months.      He says that he's been diagnosed with bipolar disorder since birth, but his symptoms are consistent with depressive disorder. Review of psychiatric symptoms is negative for prerna or psychosis.     No access to fire-arms.    Admission Diagnosis: Suicidal ideation [R45.851]  Schizoaffective disorder (HCC) [F25.9]    * No surgery found *    Results for orders placed or performed during the hospital encounter of 04/13/25   Urine Culture Hold Sample    Specimen: Urine   Result Value Ref Range    Specimen HOld        Urine on hold in Microbiology dept for 2 days.  If unpreserved urine is submitted, it cannot be used for addtional testing  patient have a Psychiatric Advance Directive? No  If the patient does not have a surrogate or Medical Advance Directive AND Psychiatric Advance Directive, the patient was offered information on these advance directives Patient declined to complete    Patient Instructions: Please continue all medications until otherwise directed by physician.      Tobacco Cessation Discharge Plan:   Is the patient a tobacco user  and needs referral for tobacco cessation? Yes  Patient referred to the following for tobacco cessation with an appointment? Patient refused  Patient was offered medication to assist with tobacco cessation at discharge? Yes    Alcohol/Substance Abuse Discharge Plan:   Does the patient have a history of substance/alcohol abuse and requires a referral for treatment? Yes  Patient referred to the following for substance/alcohol abuse treatment with an appointment? Yes  Patient was offered medication to assist with substance/alcohol abuse cessation at discharge? No      Patient discharged to: Inpatient facility; 24-hour/7-day contact information (including physician for emergencies related to inpatient stay), contact information for pending studies, plan for follow-up care, and primary physician, or other healthcare professional, or site designated for follow up care discussed with receiving inpatient facility.

## 2025-04-25 NOTE — PROGRESS NOTES
Spiritual Health History and Assessment/Progress Note  [facility]    Behavioral Health,  ,  , Follow up     Name: Justino Preciado MRN: [id number]    Age: 60 y.o.     Sex: male   Language: English   Sikhism: Restorationism   Schizoaffective disorder (HCC)     Date: 4/25/2025            Total Time Calculated: 15 min              Spiritual Assessment began in [facility]        Referral/Consult From: Rounding   Encounter Overview/Reason: Behavioral Health  Service Provided For: Patient    Marleen, Belief, Meaning:   Patient identifies as spiritual  Family/Friends No family/friends present      Importance and Influence:  Patient has spiritual/personal beliefs that influence decisions regarding their health  Family/Friends     Community:  Patient Other: unable to assess at this time  Family/Friends     Assessment and Plan of Care:    visited with patient while rounding. Patient expressed that he was feeling anxious and frustrated about being discharged.  assessed patient to be spiritually distress as evidence by his expressions of frustration and agitation surrounding going to new facility  Patient Interventions include: Facilitated expression of thoughts and feelings and Other:  attempted to support patient by engaging his spirituality as a coping mechanism.   Family/Friends Interventions include:     Patient Plan of Care: Spiritual Care available upon further referral  Family/Friends Plan of Care:     Electronically signed by Ngoc Carrillolain Resident on 4/25/2025 at 1:01 PM

## 2025-04-25 NOTE — INTERDISCIPLINARY ROUNDS
Behavioral Health Interdisciplinary Rounds     Patient Name: Justino Preciado  Age: 60 y.o.  Room/Bed:  [facility]  Primary Diagnosis: Schizoaffective disorder (HCC)   Admission Status:      Readmission within 30 days:   Power of  in place:   Patient requires a blocked bed: no          Reason for blocked bed:   Sleep hours: 6  Flu vaccine :       Participation in Care/Groups:  yes  Medication Compliant?: yes  PRNS (last 24 hours): anxiety     Restraints (last 24 hours):  no  __________________________________________________  OQ Admission Analysis Survey completed:  OQ Admission Analysis Survey score:    __________________________________________________     Alcohol screening (AUDIT) completed -     Score:   Tobacco :  Illegal Drugs use:   CSSR Lifetime:     24 hour chart check complete: yes     _______________________________________________    Patient goal(s) for today:   Treatment team focus/goals:   Progress note:      Spiritual Care Consult:   Financial concerns/prescription coverage:    Family contact:                        Family requesting physician contact today:    Discharge plan:   Access to weapons :                                                              Outpatient provider(s):     LOS:  11  Expected LOS:     Participating treatment team members: Justino Ilana, * (assigned SW),

## 2025-04-25 NOTE — PROGRESS NOTES
Pharmacist Discharge Medication Reconciliation    Discharging Provider: Dr. Kauffman    Significant PMH:   Past Medical History:   Diagnosis Date    Hypertension      Chief Complaint for this Admission:   Chief Complaint   Patient presents with    Suicidal     Allergies: Patient has no known allergies.    Discharge Medications:      Medication List        START taking these medications      cetirizine 10 MG tablet  Commonly known as: ZYRTEC  Take 1 tablet by mouth daily  Start taking on: April 26, 2025     escitalopram 20 MG tablet  Commonly known as: LEXAPRO  Take 1 tablet by mouth daily  Start taking on: April 26, 2025     fluticasone 50 MCG/ACT nasal spray  Commonly known as: FLONASE  1 spray by Each Nostril route daily as needed for Rhinitis     hydroCHLOROthiazide 50 MG tablet  Commonly known as: HYDRODIURIL  Take 1 tablet by mouth daily  Start taking on: April 26, 2025     prazosin 1 MG capsule  Commonly known as: MINIPRESS  Take 3 capsules by mouth nightly     traZODone 100 MG tablet  Commonly known as: DESYREL  Take 2 tablets by mouth nightly     trimethoprim-polymyxin b 21014-5.1 UNIT/ML-% ophthalmic solution  Commonly known as: POLYTRIM  Place 1 drop into both eyes every 6 hours for 2 doses            CHANGE how you take these medications      amLODIPine 10 MG tablet  Commonly known as: NORVASC  Take 1 tablet by mouth daily  What changed: Another medication with the same name was removed. Continue taking this medication, and follow the directions you see here.     lisinopril 40 MG tablet  Commonly known as: PRINIVIL;ZESTRIL  Take 1 tablet by mouth daily  Start taking on: April 26, 2025  What changed:   medication strength  how much to take            CONTINUE taking these medications      atorvastatin 20 MG tablet  Commonly known as: LIPITOR  Take 1 tablet by mouth daily            STOP taking these medications      famotidine 20 MG tablet  Commonly known as: Pepcid     FLUoxetine 20 MG capsule  Commonly

## 2025-04-25 NOTE — DISCHARGE SUMMARY
DISCHARGE SUMMARY    Some parts of the discharge summary are from the initial Psychiatric interview that was done on admission by the admitting psychiatrist.      Date of Admission: 4/13/2025    Date of Discharge:4/25/2025     TYPE OF DISCHARGE:   REGULAR -  YES    ADMISSION EVALUATION:  CHIEF COMPLAINT: 'i've been bipolar for years.'     HISTORY OF PRESENTING COMPLAINT:  Justino Preciado is a 60 y.o. White (non-) male w/pmh of HTN, HLD, and past psychiatric history of MDD, recurrent, severe, without psychosis, and cocaine use disorder presented to [facility] on 4/13/2025 who presented for worsening suicidal thoughts and plans to end his life.     Justino says that he's been struggling with depressive symptoms ever since he moved from [place], and has been attempting to end his life. He reports having walked in front of a bus. He came to hospital as a last resort before he attempts to end his life. He reports being depressed, experiencing recurrent nightmares and inability to motivate. He's been without resources and living in his care for the past 6 months.      He says that he's been diagnosed with bipolar disorder since birth, but his symptoms are consistent with depressive disorder. Review of psychiatric symptoms is negative for prerna or psychosis.     No access to fire-arms.     PAST PSYCHIATRIC HISTORY   1 past inpatient psychiatric admissions, last being at [facility]  1 suicide attempts, last being by walking in front of a bus.     SUBSTANCE USE HISTORY:  Tobacco: intermittent  Cannabis: none  Alcohol: rare  IVD: none  Cocaine intermittently.     PAST MEDICAL HISTORY:     Please see H&P for details.      Past Medical History        Past Medical History:   Diagnosis Date    Hypertension           Home Medications   Prior to Admission medications    Not on File               Lab Results   Component Value Date     WBC 5.4 04/13/2025     HGB 14.3 04/13/2025     HCT 42.4 04/13/2025     MCV 91.6 04/13/2025      04/13/2025  Rationale:  04/24/2025  Scheduled one time dulcolex for today.  Otherwise continue current medication regimen.     04/23/2025  Provided for one time dose of dulcolex. Scheduled pericolace 1 tab bid.  Continue dispo planning.     04/22/2025  Continue lexapro 20mg po qday.  Increase trazodone from 150 to 200mg po qhs.  Continue prazosin 3mg po qhs.     04/21/2025  Increase lexapro from 10 to 20mg po qday. Will give dose of lexapro 10mg x once.  Start HCTZ 25mg po qday for HTN.     4/20/25  Increase prazosin to 3 mg at HS for nightmares, move trazodone earlier to 8 pm to help with sleep induction.     04/19/25  Prazosin 2 mg at HS     4/18/25  Increased Trazodone  to 150mg QHS.   Increased Lisinopril to 40mg .      04/17/2025  Schedule trazodone 100mg po qhs  Added trazodone 100mg po qhs prn insomnia.  Started zyrtec 10mg po qhs.     04/16/2025  Increase lisinopril from 10 to 20mg po qday  Increase lexapro from 5 to 10mg po qday  Increase prazosin from 1 to 2 po qhs.    DISCHARGE DIAGNOSIS:  MDD, Recurrent, severe, without psychosis   Cocaine use d/o       Medication List        START taking these medications      cetirizine 10 MG tablet  Commonly known as: ZYRTEC  Take 1 tablet by mouth daily  Start taking on: April 26, 2025     escitalopram 20 MG tablet  Commonly known as: LEXAPRO  Take 1 tablet by mouth daily  Start taking on: April 26, 2025     fluticasone 50 MCG/ACT nasal spray  Commonly known as: FLONASE  1 spray by Each Nostril route daily as needed for Rhinitis     hydroCHLOROthiazide 50 MG tablet  Commonly known as: HYDRODIURIL  Take 1 tablet by mouth daily  Start taking on: April 26, 2025     prazosin 1 MG capsule  Commonly known as: MINIPRESS  Take 3 capsules by mouth nightly     traZODone 100 MG tablet  Commonly known as: DESYREL  Take 2 tablets by mouth nightly     trimethoprim-polymyxin b 55130-8.1 UNIT/ML-% ophthalmic solution  Commonly known as: POLYTRIM  Place 1 drop into both eyes every 6 hours for 2

## 2025-04-25 NOTE — PLAN OF CARE
Problem: Discharge Planning  Goal: Discharge to home or other facility with appropriate resources  Outcome: Progressing  Flowsheets (Taken 4/25/2025 5553)  Discharge to home or other facility with appropriate resources:   Identify barriers to discharge with patient and caregiver   Arrange for needed discharge resources and transportation as appropriate   Identify discharge learning needs (meds, wound care, etc)   Refer to discharge planning if patient needs post-hospital services based on physician order or complex needs related to functional status, cognitive ability or social support system

## 2025-06-26 ENCOUNTER — HOSPITAL ENCOUNTER (EMERGENCY)
Facility: HOSPITAL | Age: 60
Discharge: HOME | End: 2025-06-26
Attending: EMERGENCY MEDICINE
Payer: MEDICAID

## 2025-06-26 VITALS
OXYGEN SATURATION: 96 % | SYSTOLIC BLOOD PRESSURE: 151 MMHG | HEIGHT: 73 IN | BODY MASS INDEX: 31.8 KG/M2 | RESPIRATION RATE: 18 BRPM | DIASTOLIC BLOOD PRESSURE: 89 MMHG | TEMPERATURE: 98.6 F | HEART RATE: 92 BPM

## 2025-06-26 DIAGNOSIS — N28.9 ACUTE RENAL INSUFFICIENCY: Primary | ICD-10-CM

## 2025-06-26 DIAGNOSIS — R79.89 ELEVATED TROPONIN: ICD-10-CM

## 2025-06-26 LAB
ALBUMIN SERPL-MCNC: 3.9 G/DL (ref 3.5–5)
ALBUMIN/GLOB SERPL: 1 (ref 1.1–2.2)
ALP SERPL-CCNC: 75 U/L (ref 45–117)
ALT SERPL-CCNC: 29 U/L (ref 12–78)
ANION GAP SERPL CALC-SCNC: 7 MMOL/L (ref 2–12)
AST SERPL-CCNC: 47 U/L (ref 15–37)
BASOPHILS # BLD: 0.04 K/UL (ref 0–0.1)
BASOPHILS NFR BLD: 0.5 % (ref 0–1)
BILIRUB SERPL-MCNC: 0.7 MG/DL (ref 0.2–1)
BUN SERPL-MCNC: 25 MG/DL (ref 6–20)
BUN/CREAT SERPL: 13 (ref 12–20)
CALCIUM SERPL-MCNC: 10.8 MG/DL (ref 8.5–10.1)
CHLORIDE SERPL-SCNC: 114 MMOL/L (ref 97–108)
CO2 SERPL-SCNC: 23 MMOL/L (ref 21–32)
COMMENT:: NORMAL
CREAT SERPL-MCNC: 1.93 MG/DL (ref 0.7–1.3)
DIFFERENTIAL METHOD BLD: ABNORMAL
EKG ATRIAL RATE: 92 BPM
EKG DIAGNOSIS: NORMAL
EKG P AXIS: 46 DEGREES
EKG P-R INTERVAL: 170 MS
EKG Q-T INTERVAL: 372 MS
EKG QRS DURATION: 98 MS
EKG QTC CALCULATION (BAZETT): 460 MS
EKG R AXIS: 51 DEGREES
EKG T AXIS: 14 DEGREES
EKG VENTRICULAR RATE: 92 BPM
EOSINOPHIL # BLD: 0.02 K/UL (ref 0–0.4)
EOSINOPHIL NFR BLD: 0.2 % (ref 0–7)
ERYTHROCYTE [DISTWIDTH] IN BLOOD BY AUTOMATED COUNT: 13.5 % (ref 11.5–14.5)
GLOBULIN SER CALC-MCNC: 3.8 G/DL (ref 2–4)
GLUCOSE SERPL-MCNC: 101 MG/DL (ref 65–100)
HCT VFR BLD AUTO: 38.6 % (ref 36.6–50.3)
HGB BLD-MCNC: 13.2 G/DL (ref 12.1–17)
IMM GRANULOCYTES # BLD AUTO: 0.06 K/UL (ref 0–0.04)
IMM GRANULOCYTES NFR BLD AUTO: 0.7 % (ref 0–0.5)
LYMPHOCYTES # BLD: 1.27 K/UL (ref 0.8–3.5)
LYMPHOCYTES NFR BLD: 14.4 % (ref 12–49)
MCH RBC QN AUTO: 30.8 PG (ref 26–34)
MCHC RBC AUTO-ENTMCNC: 34.2 G/DL (ref 30–36.5)
MCV RBC AUTO: 90.2 FL (ref 80–99)
MONOCYTES # BLD: 0.7 K/UL (ref 0–1)
MONOCYTES NFR BLD: 7.9 % (ref 5–13)
NEUTS SEG # BLD: 6.75 K/UL (ref 1.8–8)
NEUTS SEG NFR BLD: 76.3 % (ref 32–75)
NRBC # BLD: 0 K/UL (ref 0–0.01)
NRBC BLD-RTO: 0 PER 100 WBC
PLATELET # BLD AUTO: 221 K/UL (ref 150–400)
PMV BLD AUTO: 9.6 FL (ref 8.9–12.9)
POTASSIUM SERPL-SCNC: 3.3 MMOL/L (ref 3.5–5.1)
PROT SERPL-MCNC: 7.7 G/DL (ref 6.4–8.2)
RBC # BLD AUTO: 4.28 M/UL (ref 4.1–5.7)
SODIUM SERPL-SCNC: 144 MMOL/L (ref 136–145)
SPECIMEN HOLD: NORMAL
TROPONIN I SERPL HS-MCNC: 77 NG/L (ref 0–76)
WBC # BLD AUTO: 8.8 K/UL (ref 4.1–11.1)

## 2025-06-26 PROCEDURE — 93005 ELECTROCARDIOGRAM TRACING: CPT | Performed by: EMERGENCY MEDICINE

## 2025-06-26 PROCEDURE — 93010 ELECTROCARDIOGRAM REPORT: CPT | Performed by: SPECIALIST

## 2025-06-26 PROCEDURE — 6360000002 HC RX W HCPCS: Performed by: EMERGENCY MEDICINE

## 2025-06-26 PROCEDURE — 96361 HYDRATE IV INFUSION ADD-ON: CPT

## 2025-06-26 PROCEDURE — 84484 ASSAY OF TROPONIN QUANT: CPT

## 2025-06-26 PROCEDURE — 2580000003 HC RX 258: Performed by: EMERGENCY MEDICINE

## 2025-06-26 PROCEDURE — 96374 THER/PROPH/DIAG INJ IV PUSH: CPT

## 2025-06-26 PROCEDURE — 80053 COMPREHEN METABOLIC PANEL: CPT

## 2025-06-26 PROCEDURE — 99284 EMERGENCY DEPT VISIT MOD MDM: CPT

## 2025-06-26 PROCEDURE — 85025 COMPLETE CBC W/AUTO DIFF WBC: CPT

## 2025-06-26 RX ORDER — ONDANSETRON 2 MG/ML
4 INJECTION INTRAMUSCULAR; INTRAVENOUS ONCE
Status: COMPLETED | OUTPATIENT
Start: 2025-06-26 | End: 2025-06-26

## 2025-06-26 RX ORDER — 0.9 % SODIUM CHLORIDE 0.9 %
1000 INTRAVENOUS SOLUTION INTRAVENOUS ONCE
Status: DISCONTINUED | OUTPATIENT
Start: 2025-06-26 | End: 2025-06-26 | Stop reason: HOSPADM

## 2025-06-26 RX ORDER — 0.9 % SODIUM CHLORIDE 0.9 %
1000 INTRAVENOUS SOLUTION INTRAVENOUS ONCE
Status: COMPLETED | OUTPATIENT
Start: 2025-06-26 | End: 2025-06-26

## 2025-06-26 RX ADMIN — ONDANSETRON 4 MG: 2 INJECTION, SOLUTION INTRAMUSCULAR; INTRAVENOUS at 16:10

## 2025-06-26 RX ADMIN — SODIUM CHLORIDE 1000 ML: 0.9 INJECTION, SOLUTION INTRAVENOUS at 16:09

## 2025-06-26 ASSESSMENT — PAIN - FUNCTIONAL ASSESSMENT
PAIN_FUNCTIONAL_ASSESSMENT: 0-10
PAIN_FUNCTIONAL_ASSESSMENT: ACTIVITIES ARE NOT PREVENTED

## 2025-06-26 ASSESSMENT — PAIN SCALES - GENERAL: PAINLEVEL_OUTOF10: 6

## 2025-06-26 ASSESSMENT — PAIN DESCRIPTION - FREQUENCY: FREQUENCY: CONTINUOUS

## 2025-06-26 ASSESSMENT — PAIN DESCRIPTION - ORIENTATION: ORIENTATION: MID

## 2025-06-26 ASSESSMENT — PAIN DESCRIPTION - DESCRIPTORS: DESCRIPTORS: DISCOMFORT

## 2025-06-26 ASSESSMENT — PAIN DESCRIPTION - PAIN TYPE: TYPE: ACUTE PAIN

## 2025-06-26 ASSESSMENT — PAIN DESCRIPTION - ONSET: ONSET: SUDDEN

## 2025-06-26 ASSESSMENT — PAIN DESCRIPTION - LOCATION: LOCATION: CHEST

## 2025-06-26 NOTE — ED TRIAGE NOTES
Patient arrives to ER via EMS. Says he is homeless and has been out in the heat since 0900. Mild chest discomfort. EMS administered 500 ML bolus.

## 2025-06-26 NOTE — ED NOTES
Pt states he had to go and does not want to wait for IVF to finish. Pt asking to leave AMA. MD Palencia made aware, Pt educated on importance of receiving treatment. Pt continued to request AMA. Paperwork signed, IV removed.

## 2025-06-26 NOTE — ED PROVIDER NOTES
[facility] EMERGENCY DEPARTMENT  EMERGENCY DEPARTMENT ENCOUNTER      Pt Name: Justino Preciado  MRN: [id number]  Birthdate 1965  Date of evaluation: 6/26/2025  Provider: Daniel Palencia DO    CHIEF COMPLAINT       Chief Complaint   Patient presents with    Heat Exposure         HISTORY OF PRESENT ILLNESS   (Location/Symptom, Timing/Onset, Context/Setting, Quality, Duration, Modifying Factors, Severity)  Note limiting factors.   59 yo male arrives for complaints of heat exposure. He has been outside all day, not drinking much water. He began to have CP/Nausea earlier due to heat, not feeling better, complaining of dehydration and lightheadedness. No CP currently. He takes meds for hypertension, endorses tobacco and etoh use today. No other complaints.     The history is provided by the patient.         Review of External Medical Records:     Nursing Notes were reviewed.    REVIEW OF SYSTEMS    (2-9 systems for level 4, 10 or more for level 5)     Review of Systems    Except as noted above the remainder of the review of systems was reviewed and negative.       PAST MEDICAL HISTORY     Past Medical History:   Diagnosis Date    Hypertension          SURGICAL HISTORY     No past surgical history on file.      CURRENT MEDICATIONS       Discharge Medication List as of 6/26/2025  6:12 PM        CONTINUE these medications which have NOT CHANGED    Details   amLODIPine (NORVASC) 10 MG tablet Take 1 tablet by mouth daily, Disp-30 tablet, R-0Normal      atorvastatin (LIPITOR) 20 MG tablet Take 1 tablet by mouth daily, Disp-30 tablet, R-0Normal      escitalopram (LEXAPRO) 20 MG tablet Take 1 tablet by mouth daily, Disp-30 tablet, R-0Normal      fluticasone (FLONASE) 50 MCG/ACT nasal spray 1 spray by Each Nostril route daily as needed for Rhinitis, Disp-9.9 mL, R-0Normal      hydroCHLOROthiazide (HYDRODIURIL) 50 MG tablet Take 1 tablet by mouth daily, Disp-30 tablet, R-0Normal      lisinopril (PRINIVIL;ZESTRIL) 40 MG